# Patient Record
Sex: MALE | Employment: UNEMPLOYED | URBAN - METROPOLITAN AREA
[De-identification: names, ages, dates, MRNs, and addresses within clinical notes are randomized per-mention and may not be internally consistent; named-entity substitution may affect disease eponyms.]

---

## 2021-10-20 ENCOUNTER — LAB ENCOUNTER (OUTPATIENT)
Dept: LAB | Facility: HOSPITAL | Age: 31
End: 2021-10-20
Attending: UROLOGY
Payer: COMMERCIAL

## 2021-10-20 DIAGNOSIS — N46.9 MALE INFERTILITY: Primary | ICD-10-CM

## 2021-10-20 PROCEDURE — 89320 SEMEN ANAL VOL/COUNT/MOT: CPT

## 2021-11-23 ENCOUNTER — LAB ENCOUNTER (OUTPATIENT)
Dept: LAB | Facility: HOSPITAL | Age: 31
End: 2021-11-23
Attending: UROLOGY
Payer: COMMERCIAL

## 2021-11-23 DIAGNOSIS — N46.9 MALE INFERTILITY: Primary | ICD-10-CM

## 2021-11-23 PROCEDURE — 89320 SEMEN ANAL VOL/COUNT/MOT: CPT

## 2022-05-09 ENCOUNTER — OFFICE VISIT (OUTPATIENT)
Dept: FAMILY MEDICINE CLINIC | Facility: CLINIC | Age: 32
End: 2022-05-09
Payer: COMMERCIAL

## 2022-05-09 VITALS
BODY MASS INDEX: 24.23 KG/M2 | OXYGEN SATURATION: 97 % | SYSTOLIC BLOOD PRESSURE: 120 MMHG | TEMPERATURE: 99 F | DIASTOLIC BLOOD PRESSURE: 80 MMHG | WEIGHT: 199 LBS | HEIGHT: 76 IN | HEART RATE: 101 BPM | RESPIRATION RATE: 16 BRPM

## 2022-05-09 DIAGNOSIS — J06.9 UPPER RESPIRATORY INFECTION, ACUTE: Primary | ICD-10-CM

## 2022-05-09 DIAGNOSIS — Z11.52 ENCOUNTER FOR SCREENING FOR COVID-19: ICD-10-CM

## 2022-05-09 DIAGNOSIS — R05.9 COUGH: ICD-10-CM

## 2022-05-09 DIAGNOSIS — R09.81 NASAL CONGESTION: ICD-10-CM

## 2022-05-09 DIAGNOSIS — Z87.898 HISTORY OF DIARRHEA: ICD-10-CM

## 2022-05-09 LAB — SARS-COV-2 RNA RESP QL NAA+PROBE: NOT DETECTED

## 2022-05-09 PROCEDURE — 3079F DIAST BP 80-89 MM HG: CPT | Performed by: PHYSICIAN ASSISTANT

## 2022-05-09 PROCEDURE — 3008F BODY MASS INDEX DOCD: CPT | Performed by: PHYSICIAN ASSISTANT

## 2022-05-09 PROCEDURE — 99202 OFFICE O/P NEW SF 15 MIN: CPT | Performed by: PHYSICIAN ASSISTANT

## 2022-05-09 PROCEDURE — 3074F SYST BP LT 130 MM HG: CPT | Performed by: PHYSICIAN ASSISTANT

## 2022-05-09 RX ORDER — BENZONATATE 200 MG/1
200 CAPSULE ORAL 2 TIMES DAILY PRN
Qty: 10 CAPSULE | Refills: 0 | Status: SHIPPED | OUTPATIENT
Start: 2022-05-09

## 2022-05-17 ENCOUNTER — OFFICE VISIT (OUTPATIENT)
Dept: FAMILY MEDICINE CLINIC | Facility: CLINIC | Age: 32
End: 2022-05-17
Payer: COMMERCIAL

## 2022-05-17 VITALS
WEIGHT: 199 LBS | TEMPERATURE: 99 F | RESPIRATION RATE: 16 BRPM | SYSTOLIC BLOOD PRESSURE: 133 MMHG | HEART RATE: 110 BPM | DIASTOLIC BLOOD PRESSURE: 65 MMHG | OXYGEN SATURATION: 97 % | BODY MASS INDEX: 24.23 KG/M2 | HEIGHT: 76 IN

## 2022-05-17 DIAGNOSIS — J32.9 RHINOSINUSITIS: Primary | ICD-10-CM

## 2022-05-17 DIAGNOSIS — J31.0 RHINOSINUSITIS: Primary | ICD-10-CM

## 2022-05-17 DIAGNOSIS — J20.9 ACUTE BRONCHITIS, UNSPECIFIED ORGANISM: ICD-10-CM

## 2022-05-17 PROCEDURE — 3008F BODY MASS INDEX DOCD: CPT | Performed by: NURSE PRACTITIONER

## 2022-05-17 PROCEDURE — 3075F SYST BP GE 130 - 139MM HG: CPT | Performed by: NURSE PRACTITIONER

## 2022-05-17 PROCEDURE — 99213 OFFICE O/P EST LOW 20 MIN: CPT | Performed by: NURSE PRACTITIONER

## 2022-05-17 PROCEDURE — 3078F DIAST BP <80 MM HG: CPT | Performed by: NURSE PRACTITIONER

## 2022-05-17 RX ORDER — DOXYCYCLINE HYCLATE 100 MG
TABLET ORAL
Qty: 14 TABLET | Refills: 0 | Status: SHIPPED | OUTPATIENT
Start: 2022-05-17

## 2022-06-02 ENCOUNTER — LAB ENCOUNTER (OUTPATIENT)
Dept: LAB | Facility: HOSPITAL | Age: 32
End: 2022-06-02
Attending: INTERNAL MEDICINE
Payer: COMMERCIAL

## 2022-06-02 ENCOUNTER — HOSPITAL ENCOUNTER (OUTPATIENT)
Dept: NUCLEAR MEDICINE | Facility: HOSPITAL | Age: 32
Discharge: HOME | End: 2022-06-02
Attending: INTERNAL MEDICINE
Payer: COMMERCIAL

## 2022-06-02 DIAGNOSIS — E05.00 BASEDOW'S DISEASE: Primary | ICD-10-CM

## 2022-06-02 DIAGNOSIS — E05.00 GRAVES DISEASE: ICD-10-CM

## 2022-06-02 LAB
ALBUMIN SERPL-MCNC: 3.1 G/DL (ref 3.4–5)
ALBUMIN/GLOB SERPL: 0.8 {RATIO} (ref 1–2)
ALP LIVER SERPL-CCNC: 106 U/L
ALT SERPL-CCNC: 211 U/L
ANION GAP SERPL CALC-SCNC: 7 MMOL/L (ref 0–18)
AST SERPL-CCNC: 68 U/L (ref 15–37)
BILIRUB SERPL-MCNC: 0.7 MG/DL (ref 0.1–2)
BUN BLD-MCNC: 20 MG/DL (ref 7–18)
BUN/CREAT SERPL: 25.3 (ref 10–20)
CALCIUM BLD-MCNC: 10.7 MG/DL (ref 8.5–10.1)
CHLORIDE SERPL-SCNC: 106 MMOL/L (ref 98–112)
CO2 SERPL-SCNC: 27 MMOL/L (ref 21–32)
CREAT BLD-MCNC: 0.79 MG/DL
FASTING STATUS PATIENT QL REPORTED: YES
GLOBULIN PLAS-MCNC: 4.1 G/DL (ref 2.8–4.4)
GLUCOSE BLD-MCNC: 97 MG/DL (ref 70–99)
OSMOLALITY SERPL CALC.SUM OF ELEC: 293 MOSM/KG (ref 275–295)
POTASSIUM SERPL-SCNC: 4.2 MMOL/L (ref 3.5–5.1)
PROT SERPL-MCNC: 7.2 G/DL (ref 6.4–8.2)
SODIUM SERPL-SCNC: 140 MMOL/L (ref 136–145)
T3FREE SERPL-MCNC: 20.16 PG/ML (ref 2.4–4.2)
T4 FREE SERPL-MCNC: 5.5 NG/DL (ref 0.8–1.7)
TSI SER-ACNC: <0.005 MIU/ML (ref 0.36–3.74)

## 2022-06-02 PROCEDURE — 84481 FREE ASSAY (FT-3): CPT

## 2022-06-02 PROCEDURE — 84443 ASSAY THYROID STIM HORMONE: CPT

## 2022-06-02 PROCEDURE — 36415 COLL VENOUS BLD VENIPUNCTURE: CPT

## 2022-06-02 PROCEDURE — 78014 THYROID IMAGING W/BLOOD FLOW: CPT | Performed by: INTERNAL MEDICINE

## 2022-06-02 PROCEDURE — 80053 COMPREHEN METABOLIC PANEL: CPT

## 2022-06-02 PROCEDURE — 84439 ASSAY OF FREE THYROXINE: CPT

## 2022-06-02 PROCEDURE — 84445 ASSAY OF TSI GLOBULIN: CPT

## 2022-06-06 LAB — THYROID STIMULATING IMMUNOGLOBULIN: 4.53 IU/L

## 2022-06-14 ENCOUNTER — LAB ENCOUNTER (OUTPATIENT)
Dept: LAB | Facility: HOSPITAL | Age: 32
End: 2022-06-14
Attending: INTERNAL MEDICINE
Payer: COMMERCIAL

## 2022-06-14 DIAGNOSIS — E05.00 GRAVES DISEASE: Primary | ICD-10-CM

## 2022-06-14 LAB
ALBUMIN SERPL-MCNC: 3.4 G/DL (ref 3.4–5)
ALBUMIN/GLOB SERPL: 0.9 {RATIO} (ref 1–2)
ALP LIVER SERPL-CCNC: 108 U/L
ALT SERPL-CCNC: 211 U/L
ANION GAP SERPL CALC-SCNC: 9 MMOL/L (ref 0–18)
AST SERPL-CCNC: 55 U/L (ref 15–37)
BASOPHILS # BLD AUTO: 0.05 X10(3) UL (ref 0–0.2)
BASOPHILS NFR BLD AUTO: 0.9 %
BILIRUB SERPL-MCNC: 0.8 MG/DL (ref 0.1–2)
BUN BLD-MCNC: 18 MG/DL (ref 7–18)
BUN/CREAT SERPL: 21.4 (ref 10–20)
CALCIUM BLD-MCNC: 10.2 MG/DL (ref 8.5–10.1)
CHLORIDE SERPL-SCNC: 108 MMOL/L (ref 98–112)
CO2 SERPL-SCNC: 22 MMOL/L (ref 21–32)
CREAT BLD-MCNC: 0.84 MG/DL
DEPRECATED RDW RBC AUTO: 44.8 FL (ref 35.1–46.3)
EOSINOPHIL # BLD AUTO: 0.98 X10(3) UL (ref 0–0.7)
EOSINOPHIL NFR BLD AUTO: 17.3 %
ERYTHROCYTE [DISTWIDTH] IN BLOOD BY AUTOMATED COUNT: 14 % (ref 11–15)
FASTING STATUS PATIENT QL REPORTED: YES
GLOBULIN PLAS-MCNC: 3.6 G/DL (ref 2.8–4.4)
GLUCOSE BLD-MCNC: 100 MG/DL (ref 70–99)
HCT VFR BLD AUTO: 39.6 %
HGB BLD-MCNC: 12.8 G/DL
IMM GRANULOCYTES # BLD AUTO: 0.01 X10(3) UL (ref 0–1)
IMM GRANULOCYTES NFR BLD: 0.2 %
LYMPHOCYTES # BLD AUTO: 1.92 X10(3) UL (ref 1–4)
LYMPHOCYTES NFR BLD AUTO: 33.9 %
MCH RBC QN AUTO: 28.8 PG (ref 26–34)
MCHC RBC AUTO-ENTMCNC: 32.3 G/DL (ref 31–37)
MCV RBC AUTO: 89.2 FL
MONOCYTES # BLD AUTO: 0.52 X10(3) UL (ref 0.1–1)
MONOCYTES NFR BLD AUTO: 9.2 %
NEUTROPHILS # BLD AUTO: 2.18 X10 (3) UL (ref 1.5–7.7)
NEUTROPHILS # BLD AUTO: 2.18 X10(3) UL (ref 1.5–7.7)
NEUTROPHILS NFR BLD AUTO: 38.5 %
OSMOLALITY SERPL CALC.SUM OF ELEC: 290 MOSM/KG (ref 275–295)
PLATELET # BLD AUTO: 268 10(3)UL (ref 150–450)
POTASSIUM SERPL-SCNC: 4.1 MMOL/L (ref 3.5–5.1)
PROT SERPL-MCNC: 7 G/DL (ref 6.4–8.2)
RBC # BLD AUTO: 4.44 X10(6)UL
SODIUM SERPL-SCNC: 139 MMOL/L (ref 136–145)
T3FREE SERPL-MCNC: 6.75 PG/ML (ref 2.4–4.2)
T4 FREE SERPL-MCNC: 2.5 NG/DL (ref 0.8–1.7)
WBC # BLD AUTO: 5.7 X10(3) UL (ref 4–11)

## 2022-06-14 PROCEDURE — 84481 FREE ASSAY (FT-3): CPT

## 2022-06-14 PROCEDURE — 80053 COMPREHEN METABOLIC PANEL: CPT

## 2022-06-14 PROCEDURE — 84439 ASSAY OF FREE THYROXINE: CPT

## 2022-06-14 PROCEDURE — 36415 COLL VENOUS BLD VENIPUNCTURE: CPT

## 2022-06-14 PROCEDURE — 85025 COMPLETE CBC W/AUTO DIFF WBC: CPT

## 2022-07-01 ENCOUNTER — HOSPITAL ENCOUNTER (OUTPATIENT)
Dept: NUCLEAR MEDICINE | Facility: HOSPITAL | Age: 32
Discharge: HOME | End: 2022-07-01
Attending: INTERNAL MEDICINE
Payer: COMMERCIAL

## 2022-07-01 DIAGNOSIS — E05.00 GRAVES DISEASE: ICD-10-CM

## 2022-07-01 PROCEDURE — 79005 NUCLEAR RX ORAL ADMIN: CPT | Performed by: INTERNAL MEDICINE

## 2022-07-21 ENCOUNTER — LAB ENCOUNTER (OUTPATIENT)
Dept: LAB | Facility: HOSPITAL | Age: 32
End: 2022-07-21
Attending: INTERNAL MEDICINE
Payer: COMMERCIAL

## 2022-07-21 DIAGNOSIS — E05.00 GRAVES DISEASE: ICD-10-CM

## 2022-07-21 LAB
ALBUMIN SERPL-MCNC: 4 G/DL (ref 3.4–5)
ALBUMIN/GLOB SERPL: 1.1 {RATIO} (ref 1–2)
ALP LIVER SERPL-CCNC: 116 U/L
ALT SERPL-CCNC: 55 U/L
ANION GAP SERPL CALC-SCNC: 9 MMOL/L (ref 0–18)
AST SERPL-CCNC: 64 U/L (ref 15–37)
BASOPHILS # BLD AUTO: 0.06 X10(3) UL (ref 0–0.2)
BASOPHILS NFR BLD AUTO: 1.1 %
BILIRUB SERPL-MCNC: 0.7 MG/DL (ref 0.1–2)
BUN BLD-MCNC: 20 MG/DL (ref 7–18)
BUN/CREAT SERPL: 22.5 (ref 10–20)
CALCIUM BLD-MCNC: 10 MG/DL (ref 8.5–10.1)
CHLORIDE SERPL-SCNC: 109 MMOL/L (ref 98–112)
CO2 SERPL-SCNC: 22 MMOL/L (ref 21–32)
CREAT BLD-MCNC: 0.89 MG/DL
DEPRECATED RDW RBC AUTO: 47.2 FL (ref 35.1–46.3)
EOSINOPHIL # BLD AUTO: 0.33 X10(3) UL (ref 0–0.7)
EOSINOPHIL NFR BLD AUTO: 6.2 %
ERYTHROCYTE [DISTWIDTH] IN BLOOD BY AUTOMATED COUNT: 13.9 % (ref 11–15)
FASTING STATUS PATIENT QL REPORTED: YES
GLOBULIN PLAS-MCNC: 3.7 G/DL (ref 2.8–4.4)
GLUCOSE BLD-MCNC: 95 MG/DL (ref 70–99)
HCT VFR BLD AUTO: 41.5 %
HGB BLD-MCNC: 13.5 G/DL
IMM GRANULOCYTES # BLD AUTO: 0.02 X10(3) UL (ref 0–1)
IMM GRANULOCYTES NFR BLD: 0.4 %
LYMPHOCYTES # BLD AUTO: 1.13 X10(3) UL (ref 1–4)
LYMPHOCYTES NFR BLD AUTO: 21.1 %
MCH RBC QN AUTO: 29.9 PG (ref 26–34)
MCHC RBC AUTO-ENTMCNC: 32.5 G/DL (ref 31–37)
MCV RBC AUTO: 91.8 FL
MONOCYTES # BLD AUTO: 0.52 X10(3) UL (ref 0.1–1)
MONOCYTES NFR BLD AUTO: 9.7 %
NEUTROPHILS # BLD AUTO: 3.3 X10 (3) UL (ref 1.5–7.7)
NEUTROPHILS # BLD AUTO: 3.3 X10(3) UL (ref 1.5–7.7)
NEUTROPHILS NFR BLD AUTO: 61.5 %
OSMOLALITY SERPL CALC.SUM OF ELEC: 292 MOSM/KG (ref 275–295)
PLATELET # BLD AUTO: 290 10(3)UL (ref 150–450)
POTASSIUM SERPL-SCNC: 4.1 MMOL/L (ref 3.5–5.1)
PROT SERPL-MCNC: 7.7 G/DL (ref 6.4–8.2)
RBC # BLD AUTO: 4.52 X10(6)UL
SODIUM SERPL-SCNC: 140 MMOL/L (ref 136–145)
T3FREE SERPL-MCNC: 3.27 PG/ML (ref 2.4–4.2)
T4 FREE SERPL-MCNC: 0.9 NG/DL (ref 0.8–1.7)
WBC # BLD AUTO: 5.4 X10(3) UL (ref 4–11)

## 2022-07-21 PROCEDURE — 84439 ASSAY OF FREE THYROXINE: CPT

## 2022-07-21 PROCEDURE — 80053 COMPREHEN METABOLIC PANEL: CPT

## 2022-07-21 PROCEDURE — 36415 COLL VENOUS BLD VENIPUNCTURE: CPT

## 2022-07-21 PROCEDURE — 84481 FREE ASSAY (FT-3): CPT

## 2022-07-21 PROCEDURE — 85025 COMPLETE CBC W/AUTO DIFF WBC: CPT

## 2022-08-03 ENCOUNTER — LAB ENCOUNTER (OUTPATIENT)
Dept: LAB | Facility: HOSPITAL | Age: 32
End: 2022-08-03
Attending: INTERNAL MEDICINE
Payer: COMMERCIAL

## 2022-08-03 DIAGNOSIS — E05.00 GRAVES DISEASE: ICD-10-CM

## 2022-08-03 LAB
ALBUMIN SERPL-MCNC: 3.8 G/DL (ref 3.4–5)
ALBUMIN/GLOB SERPL: 1.1 {RATIO} (ref 1–2)
ALP LIVER SERPL-CCNC: 142 U/L
ALT SERPL-CCNC: 33 U/L
ANION GAP SERPL CALC-SCNC: 6 MMOL/L (ref 0–18)
AST SERPL-CCNC: 16 U/L (ref 15–37)
BASOPHILS # BLD AUTO: 0.05 X10(3) UL (ref 0–0.2)
BASOPHILS NFR BLD AUTO: 0.9 %
BILIRUB SERPL-MCNC: 0.4 MG/DL (ref 0.1–2)
BUN BLD-MCNC: 14 MG/DL (ref 7–18)
BUN/CREAT SERPL: 15.6 (ref 10–20)
CALCIUM BLD-MCNC: 9.7 MG/DL (ref 8.5–10.1)
CHLORIDE SERPL-SCNC: 112 MMOL/L (ref 98–112)
CO2 SERPL-SCNC: 25 MMOL/L (ref 21–32)
CREAT BLD-MCNC: 0.9 MG/DL
DEPRECATED RDW RBC AUTO: 48.3 FL (ref 35.1–46.3)
EOSINOPHIL # BLD AUTO: 0.34 X10(3) UL (ref 0–0.7)
EOSINOPHIL NFR BLD AUTO: 6.4 %
ERYTHROCYTE [DISTWIDTH] IN BLOOD BY AUTOMATED COUNT: 14 % (ref 11–15)
FASTING STATUS PATIENT QL REPORTED: YES
GFR SERPLBLD BASED ON 1.73 SQ M-ARVRAT: 116 ML/MIN/1.73M2 (ref 60–?)
GLOBULIN PLAS-MCNC: 3.4 G/DL (ref 2.8–4.4)
GLUCOSE BLD-MCNC: 95 MG/DL (ref 70–99)
HCT VFR BLD AUTO: 43.6 %
HGB BLD-MCNC: 13.6 G/DL
IMM GRANULOCYTES # BLD AUTO: 0.02 X10(3) UL (ref 0–1)
IMM GRANULOCYTES NFR BLD: 0.4 %
LYMPHOCYTES # BLD AUTO: 1.4 X10(3) UL (ref 1–4)
LYMPHOCYTES NFR BLD AUTO: 26.4 %
MCH RBC QN AUTO: 29.2 PG (ref 26–34)
MCHC RBC AUTO-ENTMCNC: 31.2 G/DL (ref 31–37)
MCV RBC AUTO: 93.8 FL
MONOCYTES # BLD AUTO: 0.55 X10(3) UL (ref 0.1–1)
MONOCYTES NFR BLD AUTO: 10.4 %
NEUTROPHILS # BLD AUTO: 2.94 X10 (3) UL (ref 1.5–7.7)
NEUTROPHILS # BLD AUTO: 2.94 X10(3) UL (ref 1.5–7.7)
NEUTROPHILS NFR BLD AUTO: 55.5 %
OSMOLALITY SERPL CALC.SUM OF ELEC: 296 MOSM/KG (ref 275–295)
PLATELET # BLD AUTO: 260 10(3)UL (ref 150–450)
POTASSIUM SERPL-SCNC: 4 MMOL/L (ref 3.5–5.1)
PROT SERPL-MCNC: 7.2 G/DL (ref 6.4–8.2)
RBC # BLD AUTO: 4.65 X10(6)UL
SODIUM SERPL-SCNC: 143 MMOL/L (ref 136–145)
T3FREE SERPL-MCNC: 3.3 PG/ML (ref 2.4–4.2)
T4 FREE SERPL-MCNC: 1 NG/DL (ref 0.8–1.7)
WBC # BLD AUTO: 5.3 X10(3) UL (ref 4–11)

## 2022-08-03 PROCEDURE — 84439 ASSAY OF FREE THYROXINE: CPT

## 2022-08-03 PROCEDURE — 36415 COLL VENOUS BLD VENIPUNCTURE: CPT

## 2022-08-03 PROCEDURE — 85025 COMPLETE CBC W/AUTO DIFF WBC: CPT

## 2022-08-03 PROCEDURE — 80053 COMPREHEN METABOLIC PANEL: CPT

## 2022-08-03 PROCEDURE — 84481 FREE ASSAY (FT-3): CPT

## 2022-09-19 ENCOUNTER — LAB ENCOUNTER (OUTPATIENT)
Dept: LAB | Facility: HOSPITAL | Age: 32
End: 2022-09-19
Attending: INTERNAL MEDICINE

## 2022-09-19 DIAGNOSIS — E05.00 GRAVES DISEASE: ICD-10-CM

## 2022-09-19 LAB
ALBUMIN SERPL-MCNC: 4 G/DL (ref 3.4–5)
ALBUMIN/GLOB SERPL: 1.3 {RATIO} (ref 1–2)
ALP LIVER SERPL-CCNC: 146 U/L
ALT SERPL-CCNC: 37 U/L
ANION GAP SERPL CALC-SCNC: 5 MMOL/L (ref 0–18)
AST SERPL-CCNC: 53 U/L (ref 15–37)
BASOPHILS # BLD AUTO: 0.08 X10(3) UL (ref 0–0.2)
BASOPHILS NFR BLD AUTO: 1.3 %
BILIRUB SERPL-MCNC: 0.3 MG/DL (ref 0.1–2)
BUN BLD-MCNC: 20 MG/DL (ref 7–18)
BUN/CREAT SERPL: 15.3 (ref 10–20)
CALCIUM BLD-MCNC: 9.1 MG/DL (ref 8.5–10.1)
CHLORIDE SERPL-SCNC: 107 MMOL/L (ref 98–112)
CO2 SERPL-SCNC: 27 MMOL/L (ref 21–32)
CREAT BLD-MCNC: 1.31 MG/DL
DEPRECATED RDW RBC AUTO: 45.9 FL (ref 35.1–46.3)
EOSINOPHIL # BLD AUTO: 0.45 X10(3) UL (ref 0–0.7)
EOSINOPHIL NFR BLD AUTO: 7.3 %
ERYTHROCYTE [DISTWIDTH] IN BLOOD BY AUTOMATED COUNT: 13.4 % (ref 11–15)
FASTING STATUS PATIENT QL REPORTED: YES
GFR SERPLBLD BASED ON 1.73 SQ M-ARVRAT: 74 ML/MIN/1.73M2 (ref 60–?)
GLOBULIN PLAS-MCNC: 3.1 G/DL (ref 2.8–4.4)
GLUCOSE BLD-MCNC: 98 MG/DL (ref 70–99)
HCT VFR BLD AUTO: 43.8 %
HGB BLD-MCNC: 14.5 G/DL
IMM GRANULOCYTES # BLD AUTO: 0.03 X10(3) UL (ref 0–1)
IMM GRANULOCYTES NFR BLD: 0.5 %
LYMPHOCYTES # BLD AUTO: 2.14 X10(3) UL (ref 1–4)
LYMPHOCYTES NFR BLD AUTO: 34.8 %
MCH RBC QN AUTO: 31 PG (ref 26–34)
MCHC RBC AUTO-ENTMCNC: 33.1 G/DL (ref 31–37)
MCV RBC AUTO: 93.6 FL
MONOCYTES # BLD AUTO: 0.36 X10(3) UL (ref 0.1–1)
MONOCYTES NFR BLD AUTO: 5.9 %
NEUTROPHILS # BLD AUTO: 3.09 X10 (3) UL (ref 1.5–7.7)
NEUTROPHILS # BLD AUTO: 3.09 X10(3) UL (ref 1.5–7.7)
NEUTROPHILS NFR BLD AUTO: 50.2 %
OSMOLALITY SERPL CALC.SUM OF ELEC: 291 MOSM/KG (ref 275–295)
PLATELET # BLD AUTO: 209 10(3)UL (ref 150–450)
POTASSIUM SERPL-SCNC: 4.2 MMOL/L (ref 3.5–5.1)
PROT SERPL-MCNC: 7.1 G/DL (ref 6.4–8.2)
RBC # BLD AUTO: 4.68 X10(6)UL
SODIUM SERPL-SCNC: 139 MMOL/L (ref 136–145)
T3FREE SERPL-MCNC: 0.79 PG/ML (ref 2.4–4.2)
T4 FREE SERPL-MCNC: 0.2 NG/DL (ref 0.8–1.7)
WBC # BLD AUTO: 6.2 X10(3) UL (ref 4–11)

## 2022-09-19 PROCEDURE — 80053 COMPREHEN METABOLIC PANEL: CPT

## 2022-09-19 PROCEDURE — 84439 ASSAY OF FREE THYROXINE: CPT

## 2022-09-19 PROCEDURE — 85025 COMPLETE CBC W/AUTO DIFF WBC: CPT

## 2022-09-19 PROCEDURE — 36415 COLL VENOUS BLD VENIPUNCTURE: CPT

## 2022-09-19 PROCEDURE — 84481 FREE ASSAY (FT-3): CPT

## 2022-11-01 ENCOUNTER — OFFICE VISIT (OUTPATIENT)
Dept: OTOLARYNGOLOGY | Facility: CLINIC | Age: 32
End: 2022-11-01
Payer: COMMERCIAL

## 2022-11-01 DIAGNOSIS — J33.9 NASAL POLYPOSIS: Primary | ICD-10-CM

## 2022-11-01 PROCEDURE — 31231 NASAL ENDOSCOPY DX: CPT | Performed by: STUDENT IN AN ORGANIZED HEALTH CARE EDUCATION/TRAINING PROGRAM

## 2022-11-01 PROCEDURE — 99204 OFFICE O/P NEW MOD 45 MIN: CPT | Performed by: STUDENT IN AN ORGANIZED HEALTH CARE EDUCATION/TRAINING PROGRAM

## 2022-11-01 RX ORDER — BUDESONIDE 1 MG/2ML
1 INHALANT ORAL 2 TIMES DAILY
Qty: 60 EACH | Refills: 0 | Status: SHIPPED | OUTPATIENT
Start: 2022-11-01 | End: 2022-12-01

## 2022-11-01 RX ORDER — PREDNISONE 20 MG/1
TABLET ORAL
Qty: 23 TABLET | Refills: 0 | Status: SHIPPED | OUTPATIENT
Start: 2022-11-01 | End: 2022-11-14

## 2022-11-11 ENCOUNTER — HOSPITAL ENCOUNTER (OUTPATIENT)
Dept: CT IMAGING | Facility: HOSPITAL | Age: 32
Discharge: HOME | End: 2022-11-11
Attending: STUDENT IN AN ORGANIZED HEALTH CARE EDUCATION/TRAINING PROGRAM
Payer: COMMERCIAL

## 2022-11-11 DIAGNOSIS — J33.9 NASAL POLYPOSIS: ICD-10-CM

## 2022-11-11 PROCEDURE — 70486 CT MAXILLOFACIAL W/O DYE: CPT | Performed by: STUDENT IN AN ORGANIZED HEALTH CARE EDUCATION/TRAINING PROGRAM

## 2022-11-15 ENCOUNTER — LAB ENCOUNTER (OUTPATIENT)
Dept: LAB | Facility: HOSPITAL | Age: 32
End: 2022-11-15
Attending: INTERNAL MEDICINE
Payer: COMMERCIAL

## 2022-11-15 DIAGNOSIS — E05.00 GRAVES' DISEASE: Primary | ICD-10-CM

## 2022-11-15 LAB
T4 FREE SERPL-MCNC: 1.2 NG/DL (ref 0.8–1.7)
TSI SER-ACNC: 3.06 MIU/ML (ref 0.36–3.74)

## 2022-11-15 PROCEDURE — 36415 COLL VENOUS BLD VENIPUNCTURE: CPT

## 2022-11-15 PROCEDURE — 84443 ASSAY THYROID STIM HORMONE: CPT

## 2022-11-15 PROCEDURE — 84439 ASSAY OF FREE THYROXINE: CPT

## 2022-11-28 RX ORDER — BUDESONIDE 1 MG/2ML
1 INHALANT ORAL 2 TIMES DAILY
Qty: 60 ML | Refills: 0 | Status: SHIPPED | OUTPATIENT
Start: 2022-11-28 | End: 2022-12-28

## 2022-12-05 ENCOUNTER — OFFICE VISIT (OUTPATIENT)
Dept: OTOLARYNGOLOGY | Facility: CLINIC | Age: 32
End: 2022-12-05
Payer: COMMERCIAL

## 2022-12-05 VITALS — WEIGHT: 199 LBS | TEMPERATURE: 98 F | HEIGHT: 76 IN | BODY MASS INDEX: 24.23 KG/M2

## 2022-12-05 DIAGNOSIS — J33.9 NASAL POLYPOSIS: Primary | ICD-10-CM

## 2022-12-05 PROCEDURE — 3008F BODY MASS INDEX DOCD: CPT | Performed by: STUDENT IN AN ORGANIZED HEALTH CARE EDUCATION/TRAINING PROGRAM

## 2022-12-05 PROCEDURE — 99214 OFFICE O/P EST MOD 30 MIN: CPT | Performed by: STUDENT IN AN ORGANIZED HEALTH CARE EDUCATION/TRAINING PROGRAM

## 2022-12-05 RX ORDER — BUDESONIDE 1 MG/2ML
1 INHALANT ORAL 2 TIMES DAILY
Qty: 60 ML | Refills: 3 | Status: SHIPPED | OUTPATIENT
Start: 2022-12-05 | End: 2023-01-04

## 2022-12-14 ENCOUNTER — OFFICE VISIT (OUTPATIENT)
Dept: ALLERGY | Facility: CLINIC | Age: 32
End: 2022-12-14
Payer: COMMERCIAL

## 2022-12-14 ENCOUNTER — NURSE ONLY (OUTPATIENT)
Dept: ALLERGY | Facility: CLINIC | Age: 32
End: 2022-12-14
Payer: COMMERCIAL

## 2022-12-14 VITALS
HEIGHT: 76 IN | BODY MASS INDEX: 25.88 KG/M2 | HEART RATE: 87 BPM | DIASTOLIC BLOOD PRESSURE: 91 MMHG | SYSTOLIC BLOOD PRESSURE: 128 MMHG | WEIGHT: 212.5 LBS

## 2022-12-14 DIAGNOSIS — Z92.29 COVID-19 VACCINE SERIES COMPLETED: ICD-10-CM

## 2022-12-14 DIAGNOSIS — J30.89 SEASONAL AND PERENNIAL ALLERGIC RHINOCONJUNCTIVITIS: Primary | ICD-10-CM

## 2022-12-14 DIAGNOSIS — J30.89 SEASONAL AND PERENNIAL ALLERGIC RHINOCONJUNCTIVITIS: ICD-10-CM

## 2022-12-14 DIAGNOSIS — H10.10 SEASONAL AND PERENNIAL ALLERGIC RHINOCONJUNCTIVITIS: Primary | ICD-10-CM

## 2022-12-14 DIAGNOSIS — J30.2 SEASONAL AND PERENNIAL ALLERGIC RHINOCONJUNCTIVITIS: Primary | ICD-10-CM

## 2022-12-14 DIAGNOSIS — Z23 FLU VACCINE NEED: ICD-10-CM

## 2022-12-14 DIAGNOSIS — J30.2 SEASONAL AND PERENNIAL ALLERGIC RHINOCONJUNCTIVITIS: ICD-10-CM

## 2022-12-14 DIAGNOSIS — H10.10 SEASONAL AND PERENNIAL ALLERGIC RHINOCONJUNCTIVITIS: ICD-10-CM

## 2022-12-14 DIAGNOSIS — J33.9 NASAL POLYP: ICD-10-CM

## 2022-12-14 DIAGNOSIS — J32.2 CHRONIC ETHMOIDAL SINUSITIS: ICD-10-CM

## 2022-12-14 PROCEDURE — 3008F BODY MASS INDEX DOCD: CPT | Performed by: ALLERGY & IMMUNOLOGY

## 2022-12-14 PROCEDURE — 99244 OFF/OP CNSLTJ NEW/EST MOD 40: CPT | Performed by: ALLERGY & IMMUNOLOGY

## 2022-12-14 PROCEDURE — 95004 PERQ TESTS W/ALRGNC XTRCS: CPT | Performed by: ALLERGY & IMMUNOLOGY

## 2022-12-14 PROCEDURE — 3074F SYST BP LT 130 MM HG: CPT | Performed by: ALLERGY & IMMUNOLOGY

## 2022-12-14 PROCEDURE — 3080F DIAST BP >= 90 MM HG: CPT | Performed by: ALLERGY & IMMUNOLOGY

## 2022-12-14 NOTE — PATIENT INSTRUCTIONS
#1 chronic sinusitis with nasal polyposis  Followed by ENT with Dr. Yoni Sepulveda. Prior course of prednisone with improvement in symptoms. Patient has tried Flonase in the past without significant improvement. Patient is also been on budesonide irrigation of his sinuses. No prior Singulair or Xyzal.  See above skin testing to screen for environmental triggers    Reviewed treatment options for nasal polyps including prednisone which provides intermittent relief but not a cure. Would recommend prednisone no more than once or twice a year in my opinion  Sinus surgery has been discussed by Dr. Yoni Sepulveda  Reviewed treatment of underlying environmental allergens if they are detected with avoidance measures medications immunotherapy  Reviewed Dupixent is a potential biologic medication for treatment of underlying nasal polyposis. Patient wished to observe clinically at this time. Should symptoms worsen then may consider implementation of the above measures. Including Dupixent  Reviewed depictions trial would initially be for 3 to 6 months. Potential indefinite treatment    #2 allergic rhinitis  See above skin testing to screen for environmental allergens  Reviewed avoidance measures and potential treatment option immunotherapy  Reviewed potential medication options including Singulair Xyzal and intranasal steroid sprays.   Ranjit [place] has been indicated as a nasal steroid spray for nasal polyposis      #3 flu vaccine up-to-date    #4 COVID vaccines up-to-date

## 2023-01-10 ENCOUNTER — LAB ENCOUNTER (OUTPATIENT)
Dept: LAB | Facility: HOSPITAL | Age: 33
End: 2023-01-10
Attending: INTERNAL MEDICINE
Payer: COMMERCIAL

## 2023-01-10 DIAGNOSIS — E05.00 GRAVES DISEASE: ICD-10-CM

## 2023-01-10 LAB
ALBUMIN SERPL-MCNC: 4.3 G/DL (ref 3.4–5)
ALBUMIN/GLOB SERPL: 1.2 {RATIO} (ref 1–2)
ALP LIVER SERPL-CCNC: 110 U/L
ALT SERPL-CCNC: 28 U/L
ANION GAP SERPL CALC-SCNC: 5 MMOL/L (ref 0–18)
AST SERPL-CCNC: 13 U/L (ref 15–37)
BASOPHILS # BLD AUTO: 0.08 X10(3) UL (ref 0–0.2)
BASOPHILS NFR BLD AUTO: 1.4 %
BILIRUB SERPL-MCNC: 0.7 MG/DL (ref 0.1–2)
BUN BLD-MCNC: 19 MG/DL (ref 7–18)
BUN/CREAT SERPL: 16.8 (ref 10–20)
CALCIUM BLD-MCNC: 9.5 MG/DL (ref 8.5–10.1)
CHLORIDE SERPL-SCNC: 105 MMOL/L (ref 98–112)
CO2 SERPL-SCNC: 30 MMOL/L (ref 21–32)
CREAT BLD-MCNC: 1.13 MG/DL
DEPRECATED RDW RBC AUTO: 40.6 FL (ref 35.1–46.3)
EOSINOPHIL # BLD AUTO: 0.32 X10(3) UL (ref 0–0.7)
EOSINOPHIL NFR BLD AUTO: 5.8 %
ERYTHROCYTE [DISTWIDTH] IN BLOOD BY AUTOMATED COUNT: 11.7 % (ref 11–15)
FASTING STATUS PATIENT QL REPORTED: YES
GFR SERPLBLD BASED ON 1.73 SQ M-ARVRAT: 87 ML/MIN/1.73M2 (ref 60–?)
GLOBULIN PLAS-MCNC: 3.6 G/DL (ref 2.8–4.4)
GLUCOSE BLD-MCNC: 96 MG/DL (ref 70–99)
HCT VFR BLD AUTO: 44.5 %
HGB BLD-MCNC: 14.7 G/DL
IMM GRANULOCYTES # BLD AUTO: 0.02 X10(3) UL (ref 0–1)
IMM GRANULOCYTES NFR BLD: 0.4 %
LYMPHOCYTES # BLD AUTO: 1.82 X10(3) UL (ref 1–4)
LYMPHOCYTES NFR BLD AUTO: 32.9 %
MCH RBC QN AUTO: 31.5 PG (ref 26–34)
MCHC RBC AUTO-ENTMCNC: 33 G/DL (ref 31–37)
MCV RBC AUTO: 95.5 FL
MONOCYTES # BLD AUTO: 0.49 X10(3) UL (ref 0.1–1)
MONOCYTES NFR BLD AUTO: 8.9 %
NEUTROPHILS # BLD AUTO: 2.8 X10 (3) UL (ref 1.5–7.7)
NEUTROPHILS # BLD AUTO: 2.8 X10(3) UL (ref 1.5–7.7)
NEUTROPHILS NFR BLD AUTO: 50.6 %
OSMOLALITY SERPL CALC.SUM OF ELEC: 292 MOSM/KG (ref 275–295)
PLATELET # BLD AUTO: 268 10(3)UL (ref 150–450)
POTASSIUM SERPL-SCNC: 4.1 MMOL/L (ref 3.5–5.1)
PROT SERPL-MCNC: 7.9 G/DL (ref 6.4–8.2)
RBC # BLD AUTO: 4.66 X10(6)UL
SODIUM SERPL-SCNC: 140 MMOL/L (ref 136–145)
T3FREE SERPL-MCNC: 2.59 PG/ML (ref 2.4–4.2)
T4 FREE SERPL-MCNC: 1.2 NG/DL (ref 0.8–1.7)
WBC # BLD AUTO: 5.5 X10(3) UL (ref 4–11)

## 2023-01-10 PROCEDURE — 84439 ASSAY OF FREE THYROXINE: CPT

## 2023-01-10 PROCEDURE — 36415 COLL VENOUS BLD VENIPUNCTURE: CPT

## 2023-01-10 PROCEDURE — 80053 COMPREHEN METABOLIC PANEL: CPT

## 2023-01-10 PROCEDURE — 84481 FREE ASSAY (FT-3): CPT

## 2023-01-10 PROCEDURE — 85025 COMPLETE CBC W/AUTO DIFF WBC: CPT

## 2023-01-27 ENCOUNTER — PATIENT MESSAGE (OUTPATIENT)
Dept: OTOLARYNGOLOGY | Facility: CLINIC | Age: 33
End: 2023-01-27

## 2023-02-01 ENCOUNTER — RX ONLY (RX ONLY)
Age: 33
End: 2023-02-01

## 2023-02-03 ENCOUNTER — PATIENT MESSAGE (OUTPATIENT)
Dept: OTOLARYNGOLOGY | Facility: CLINIC | Age: 33
End: 2023-02-03

## 2023-02-07 RX ORDER — METHYLPREDNISOLONE 4 MG/1
TABLET ORAL
Qty: 21 TABLET | Refills: 0 | Status: SHIPPED | OUTPATIENT
Start: 2023-02-07

## 2023-03-21 ENCOUNTER — LAB ENCOUNTER (OUTPATIENT)
Dept: LAB | Facility: HOSPITAL | Age: 33
End: 2023-03-21
Attending: INTERNAL MEDICINE
Payer: COMMERCIAL

## 2023-03-21 DIAGNOSIS — E03.9 HYPOTHYROIDISM: Primary | ICD-10-CM

## 2023-03-21 LAB
T4 FREE SERPL-MCNC: 1.4 NG/DL (ref 0.8–1.7)
TSI SER-ACNC: 1.24 MIU/ML (ref 0.36–3.74)

## 2023-03-21 PROCEDURE — 84439 ASSAY OF FREE THYROXINE: CPT

## 2023-03-21 PROCEDURE — 36415 COLL VENOUS BLD VENIPUNCTURE: CPT

## 2023-03-21 PROCEDURE — 84443 ASSAY THYROID STIM HORMONE: CPT

## 2023-08-28 ENCOUNTER — LAB ENCOUNTER (OUTPATIENT)
Dept: LAB | Facility: HOSPITAL | Age: 33
End: 2023-08-28
Attending: INTERNAL MEDICINE
Payer: COMMERCIAL

## 2023-08-28 DIAGNOSIS — E03.9 HYPOTHYROIDISM: ICD-10-CM

## 2023-08-28 LAB
T4 FREE SERPL-MCNC: 1.2 NG/DL (ref 0.8–1.7)
TSI SER-ACNC: 4.73 MIU/ML (ref 0.36–3.74)

## 2023-08-28 PROCEDURE — 36415 COLL VENOUS BLD VENIPUNCTURE: CPT

## 2023-08-28 PROCEDURE — 84443 ASSAY THYROID STIM HORMONE: CPT

## 2023-08-28 PROCEDURE — 84439 ASSAY OF FREE THYROXINE: CPT

## 2024-02-20 ENCOUNTER — PATIENT MESSAGE (OUTPATIENT)
Dept: OTOLARYNGOLOGY | Facility: CLINIC | Age: 34
End: 2024-02-20

## 2024-02-23 NOTE — TELEPHONE ENCOUNTER
From: Luis Casper  To: Joon Rosenberg  Sent: 2/20/2024 9:18 PM CST  Subject: Polyp Removal Surgery    Dr. Rosenberg - I saw you about 14 months ago for polyps and various treatment options, the last option was surgery.     After various health events, I’d like to further examine the surgical option to remove my polyps. I was curious if someone from your office:    - could let me know the financial aspect of the surgery with my existing insurance (same as my visit)  -what would need to be done before surgery (I had a CAT scan but that was a bit ago)  - how quickly we can get the surgery on the books (we have a family addition at the end of April that I’d like to work around one way or another)    I look forward to discussing more.     Luis Casper  [phone]

## 2024-02-27 ENCOUNTER — OFFICE VISIT (OUTPATIENT)
Dept: OTOLARYNGOLOGY | Facility: CLINIC | Age: 34
End: 2024-02-27

## 2024-02-27 DIAGNOSIS — J34.89 NASAL OBSTRUCTION: ICD-10-CM

## 2024-02-27 DIAGNOSIS — J34.3 HYPERTROPHY OF BOTH INFERIOR NASAL TURBINATES: ICD-10-CM

## 2024-02-27 DIAGNOSIS — J32.9 CHRONIC SINUSITIS, UNSPECIFIED LOCATION: ICD-10-CM

## 2024-02-27 DIAGNOSIS — J34.2 NASAL SEPTAL DEVIATION: ICD-10-CM

## 2024-02-27 DIAGNOSIS — J33.9 NASAL POLYPOSIS: Primary | ICD-10-CM

## 2024-02-27 PROCEDURE — 99215 OFFICE O/P EST HI 40 MIN: CPT | Performed by: STUDENT IN AN ORGANIZED HEALTH CARE EDUCATION/TRAINING PROGRAM

## 2024-02-27 RX ORDER — LEVOTHYROXINE SODIUM 0.15 MG/1
150 TABLET ORAL DAILY
COMMUNITY

## 2024-02-27 NOTE — PROGRESS NOTES
Luis Casper is a 33 year old male.   Chief Complaint   Patient presents with    Nose Problem     Discuss nasal polyp surgery        ASSESSMENT AND PLAN:   1. Nasal polyposis  33-year-old with a history of chronic sinonasal polyps causing nasal obstruction worse on the right side.  He saw the allergist in 2022 and they went over options including Dupixent and surgery.  The patient is now leaning more towards surgery.  He states that whenever he gets a cold such as from his child he suffers a lot with his sinuses for a number of weeks before he recovers.  A previous CT scan in 2022 demonstrated bilateral chronic sinusitis and nasal polyposis.  He has been using the budesonide sinus irrigations as well as Flonase for greater than 3 months without relief of these issues.  He was previously tried on a steroid burst and taper although symptoms returned after this has been stopped.    Anterior rhinoscopy with large polyp in the posterior right nasal cavity and septal deviation to the left    Patient has been suffering with chronic sinonasal polyposis and recurrent sinusitis.  Previous CT scan in 2022 with evidence of bilateral nasal polyps and pansinusitis.  Has not responded to budesonide irrigations and Flonase for greater than 3 months.  He has been tried on oral steroids in the past but his symptoms returned after they were stopped.  He had met with the allergist discussed their options for the polyps and he would like to proceed with surgery at this point.  Discussed the risk of surgery including recurrence of the polyps.  Will obtain an updated CT scan prior to the surgery.  May place steroid eluding stents during the surgery to maximize the polyp shrinkage.  Discussed the need for budesonide irrigations after the surgery.  He was agreeable with the plan  The patient meets indications for endoscopic sinus surgery. This procedure was discussed including risks, benefits and alternatives. Risks include  bleeding, infection, orbital injury including blindness, CSF leak including meningitis, scarring, need for post operative debridement and irrigation, risks of general anesthesia, need for additional procedures and persistent symptoms. They understand, all questions were answered and would like to proceed.   - CT SINUS [facility](CPT=70486); Future    2. Nasal septal deviation    The patient meets indications for septoplasty and inferior turbinate reduction. These procedures were discussed including risks, benefits and alternatives. Risks include bleeding, infection, septal perforation, CSF leak, risks of general anesthesia, need for additional procedures and persistent symptoms. They understand, all questions were answered and would like to proceed.     3. Nasal obstruction    4. Hypertrophy of both inferior nasal turbinates      5. Chronic sinusitis, unspecified location        The patient indicates understanding of these issues and agrees to the plan.      EXAM:   There were no vitals taken for this visit.    Pertinent exam findings may also be noted above in assessment and plan     System Details   Skin Inspection - Normal.   Constitutional Overall appearance - Normal.   Head/Face Symmetric, TMJ tenderness not present    Eyes EOMI, PERRL   Right ear:  Canal clear, TM intact, no JESUS   Left ear:  Canal clear, TM intact, no JESUS   Nose:  inferior turbinates   enlarged,    Anterior rhinoscopy with large polyp in the posterior right nasal cavity and septal deviation to the left   Oral cavity/Oropharynx: No lesions or masses on inspection or palpation, tonsils symmetric    Neck: Soft without LAD, thyroid not enlarged  Voice clear/ no stridor   Other:      Scopes and Procedures:             Current Outpatient Medications   Medication Sig Dispense Refill    levothyroxine 150 MCG Oral Tab Take 1 tablet (150 mcg total) by mouth daily.      methylPREDNISolone 4 MG Oral Tablet Therapy Pack Take by oral route. 21 tablet 0       Past Medical History:   Diagnosis Date    Allergic rhinitis     Lumbar stress fracture     Quadriceps muscle rupture, left, sequela     non surgical      Social History:  Social History     Socioeconomic History    Marital status:    Tobacco Use    Smoking status: Never    Smokeless tobacco: Never   Vaping Use    Vaping Use: Never used   Substance and Sexual Activity    Alcohol use: Yes     Alcohol/week: 2.0 - 4.0 standard drinks of alcohol     Types: 2 - 4 Glasses of wine per week    Drug use: Never    Sexual activity: Yes   Social History Narrative    Works in Finance.    , no children.  Wife in marketing.    Bikes for exercise.    Golfs.    Former college football kicker          Joon Rosenberg MD  2/27/2024  2:59 PM

## 2024-02-29 ENCOUNTER — TELEPHONE (OUTPATIENT)
Dept: OTOLARYNGOLOGY | Facility: CLINIC | Age: 34
End: 2024-02-29

## 2024-02-29 DIAGNOSIS — J33.9 NASAL POLYP: ICD-10-CM

## 2024-02-29 DIAGNOSIS — J32.9 CHRONIC SINUSITIS, UNSPECIFIED LOCATION: Primary | ICD-10-CM

## 2024-02-29 DIAGNOSIS — J34.2 DEVIATED NASAL SEPTUM: ICD-10-CM

## 2024-02-29 DIAGNOSIS — J34.3 HYPERTROPHY OF NASAL TURBINATES: ICD-10-CM

## 2024-02-29 NOTE — PROGRESS NOTES
Patient scheduled for [facility] ENDOSCOPIC BILATERAL MAXILLARY ANTROSTOMY W/ TISSUE REMOVAL   ENDOSCPIC BILATERAL TOTAL ETHMOIDECTOMY   ENDOSCOPIC BILATERAL SPHENOID   ENDOSCOPIC BILATERAL FRONTAL   ENDOSCOPIC NASAL POLYPECTOMY   BILATERAL SEPTOPLASTY SUBMUCOSAL TURBINATE REDUCTION  BILATERAL FRONTAL SPHENOID BALLOON   BILATERAL SINUVA STENTS  on 3/27/24 at [facility].

## 2024-02-29 NOTE — TELEPHONE ENCOUNTER
Patient scheduled for [employer] ENDOSCOPIC BILATERAL MAXILLARY ANTROSTOMY W/ TISSUE REMOVAL   ENDOSCPIC BILATERAL TOTAL ETHMOIDECTOMY   ENDOSCOPIC BILATERAL SPHENOID   ENDOSCOPIC BILATERAL FRONTAL   ENDOSCOPIC NASAL POLYPECTOMY   BILATERAL SEPTOPLASTY SUBMUCOSAL TURBINATE REDUCTION  BILATERAL FRONTAL SPHENOID BALLOON   BILATERAL SINUVA STENTS  on 3/27/24 at [facility].

## 2024-03-08 ENCOUNTER — HOSPITAL ENCOUNTER (OUTPATIENT)
Dept: CT IMAGING | Facility: HOSPITAL | Age: 34
Discharge: HOME | End: 2024-03-08
Attending: STUDENT IN AN ORGANIZED HEALTH CARE EDUCATION/TRAINING PROGRAM
Payer: COMMERCIAL

## 2024-03-08 DIAGNOSIS — J33.9 NASAL POLYPOSIS: ICD-10-CM

## 2024-03-08 PROCEDURE — 70486 CT MAXILLOFACIAL W/O DYE: CPT | Performed by: STUDENT IN AN ORGANIZED HEALTH CARE EDUCATION/TRAINING PROGRAM

## 2024-03-13 ENCOUNTER — LAB ENCOUNTER (OUTPATIENT)
Dept: LAB | Facility: HOSPITAL | Age: 34
End: 2024-03-13
Attending: INTERNAL MEDICINE
Payer: COMMERCIAL

## 2024-03-13 DIAGNOSIS — J32.9 CHRONIC SINUSITIS, UNSPECIFIED LOCATION: ICD-10-CM

## 2024-03-13 DIAGNOSIS — J34.2 NASAL SEPTAL DEVIATION: ICD-10-CM

## 2024-03-13 DIAGNOSIS — J34.3 HYPERTROPHY OF BOTH INFERIOR NASAL TURBINATES: ICD-10-CM

## 2024-03-13 DIAGNOSIS — J34.89 NASAL OBSTRUCTION: ICD-10-CM

## 2024-03-13 DIAGNOSIS — J33.9 NASAL POLYPOSIS: Primary | ICD-10-CM

## 2024-03-13 DIAGNOSIS — E03.9 HYPOTHYROIDISM: Primary | ICD-10-CM

## 2024-03-13 LAB
T4 FREE SERPL-MCNC: 1.3 NG/DL (ref 0.8–1.7)
TSI SER-ACNC: 1.79 MIU/ML (ref 0.55–4.78)

## 2024-03-13 PROCEDURE — 84443 ASSAY THYROID STIM HORMONE: CPT

## 2024-03-13 PROCEDURE — 36415 COLL VENOUS BLD VENIPUNCTURE: CPT

## 2024-03-13 PROCEDURE — 84439 ASSAY OF FREE THYROXINE: CPT

## 2024-03-13 NOTE — PROGRESS NOTES
Patient scheduled for  [facility] ENDOSCOPIC BILATERAL MAXILLARY ANTROSTOMY W/ TISSUE REMOVAL  ENDOSCPIC BILATERAL TOTAL ETHMOIDECTOMY  ENDOSCOPIC BILATERAL SPHENOID  ENDOSCOPIC BILATERAL FRONTAL  ENDOSCOPIC NASAL POLYPECTOMY  BILATERAL SEPTOPLASTY SUBMUCOSAL TURBINATE REDUCTION BILATERAL FRONTAL SPHENOID BALLOON  BILATERAL SINUVA STENTS on 3/27/24 at [facility] with CPT Codes - 39878, 88239, 10542, [id number], 55693, [id number], [id number], [id number].

## 2024-03-22 NOTE — DISCHARGE INSTRUCTIONS
CELIA SANCHEZ MD  POST-OPERATIVE ENDOSCOPIC SINUS SURGERY INSTRUCTIONS        POST PROCEDURE CARE AND INFECTION PREVENTION:  Expect some bloody drainage from your nose.  The dressing under your nose is called a moustache dressing and is in place to absorb the bloody drainage.  If the dressing becomes saturated more than 4 times in one hour, please contact your doctor. You may use over the counter Afrin Nasal Spray inside the nose to decrease bleeding.  Follow the packaging instructions.  Nasal congestion is not unusual.  Afrin Nasal Spray can also be used to help with congestion.  Do not use for longer than the first five days post-operatively.    DO NOT blow your nose until the doctor tells you it is okay.  In order to clear your nose, begin using an over the counter Saline Nasal Spray the day after surgery.  Use 2 sprays in each nostril at least 2-3 times daily.  Occasionally a plastic splint is placed inside your nose during this surgery.  If you are discharged with a splint in place, it will be removed at your post-op follow up appointment.  NO bending or heavy lifting for 2 weeks after surgery.  Call your doctor for UNUSUAL conditions such as: excessive bleeding, severe pain, temperature greater than 102°, or other unusual complications.  If you have any questions, please call your doctor’s office.  The office phone # is   [phone] (choose option #1).  Please call to make an appointment for your follow-up visit as instructed by your doctor.  You may return to school or work per your doctor’s instructions.  Wash your hands before and after touching your dressing.  Be sure to rub your hands together with warm water and soap for 20 seconds or longer when washing.        MEDICATION:  See Medication Reconciliation Form for any new prescriptions.   DO NOT use Aspirin, Ibuprofen, Motrin, Aleve, or Ecotrin for two weeks following this surgery.  For mild pain use only Acetaminophen (Tylenol).  You may resume your  other usual medications unless instructed otherwise by your doctor.   Avoid alcohol today.  Do not drive, operate machinery or drink alcoholic beverages while taking narcotic pain medication.  Narcotic pain medication may cause constipation.  If no bowel movement in 48 hours, please contact your physician.      IN CASE OF EMERGENCY:  If you are unable to reach your doctor, call or go to the nearest emergency room.  The [facility] Emergency Department’s phone number is [phone].         INSTRUCTIONS FOR PATIENTS WITH GENERAL / IV SEDATION ONLY:  Begin with clear liquids and bland foods then progress to your normal diet if you are not nauseated.  Nausea and vomiting occasionally occur after surgery.  If you are nauseated, remain on clear liquids until it passes.  If nausea persists longer than 24 hours, please notify your doctor.  Rest on the day of surgery.  You may increase activity as tolerated starting tomorrow.  Do not drive, operate hazardous machinery, or make important personal or legal decisions for 24 hours.  You may feel dizzy or lightheaded from anesthesia.  Move cautiously for 24 hours.         HOME INSTRUCTIONS  AMBSURG HOME CARE INSTRUCTIONS: POST-OP ANESTHESIA  The medication that you received for sedation or general anesthesia can last up to 24 hours. Your judgment and reflexes may be altered, even if you feel like your normal self.      We Recommend:   Do not drive any motor vehicle or bicycle   Avoid mowing the lawn, playing sports, or working with power tools/applicances (power saws, electric knives or mixers)   That you have someone stay with you on your first night home   Do not drink alcohol or take sleeping pills or tranquilizers   Do not sign legal documents within 24 hours of your procedure   If you had a nerve block for your surgery, take extra care not to put any pressure on your arm or hand for 24 hours    It is normal:  For you to have a sore throat if you had a  breathing tube during surgery (while you were asleep!). The sore throat should get better within 48 hours. You can gargle with warm salt water (1/2 tsp in 4 oz warm water) or use a throat lozenge for comfort  To feel muscle aches or soreness especially in the abdomen, chest or neck. The achy feeling should go away in the next 24 hours  To feel weak, sleepy or \"wiped out\". Your should start feeling better in the next 24 hours.   To experience mild discomforts such as sore lip or tongue, headache, cramps, gas pains or a bloated feeling in your abdomen.   To experience mild back pain or soreness for a day or two if you had spinal or epidural anesthesia.   If you had laparoscopic surgery, to feel shoulder pain or discomfort on the day of surgery.   For some patients to have nausea after surgery/anesthesia    If you feel nausea or experience vomiting:   Try to move around less.   Eat less than usual or drink only liquids until the next morning   Nausea should resolve in about 24 hours    If you have a problem when you are at home:    Call your surgeons office     Discharge Instructions: After Your Surgery  You’ve just had surgery. During surgery, you were given medicine called anesthesia to keep you relaxed and free of pain. After surgery, you may have some pain or nausea. This is common. Here are some tips for feeling better and getting well after surgery.   Going home  Your healthcare provider will show you how to take care of yourself when you go home. They'll also answer your questions. Have an adult family member or friend drive you home. For the first 24 hours after your surgery:   Don't drive or use heavy equipment.  Don't make important decisions or sign legal papers.  Take medicines as directed.  Don't drink alcohol.  Have someone stay with you, if needed. They can watch for problems and help keep you safe.  Be sure to go to all follow-up visits with your healthcare provider. And rest after your surgery for as  long as your provider tells you to.   Coping with pain  If you have pain after surgery, pain medicine will help you feel better. Take it as directed, before pain becomes severe. Also, ask your healthcare provider or pharmacist about other ways to control pain. This might be with heat, ice, or relaxation. And follow any other instructions your surgeon or nurse gives you.      Stay on schedule with your medicine.     Tips for taking pain medicine  To get the best relief possible, remember these points:   Pain medicines can upset your stomach. Taking them with a little food may help.  Most pain relievers taken by mouth need at least 20 to 30 minutes to start to work.  Don't wait till your pain becomes severe before you take your medicine. Try to time your medicine so that you can take it before starting an activity. This might be before you get dressed, go for a walk, or sit down for dinner.  Constipation is a common side effect of some pain medicines. Call your healthcare provider before taking any medicines such as laxatives or stool softeners to help ease constipation. Also ask if you should skip any foods. Drinking lots of fluids and eating foods such as fruits and vegetables that are high in fiber can also help. Remember, don't take laxatives unless your surgeon has prescribed them.  Drinking alcohol and taking pain medicine can cause dizziness and slow your breathing. It can even be deadly. Don't drink alcohol while taking pain medicine.  Pain medicine can make you react more slowly to things. Don't drive or run machinery while taking pain medicine.  Your healthcare provider may tell you to take acetaminophen to help ease your pain. Ask them how much you're supposed to take each day. Acetaminophen or other pain relievers may interact with your prescription medicines or other over-the-counter (OTC) medicines. Some prescription medicines have acetaminophen and other ingredients in them. Using both prescription and  OTC acetaminophen for pain can cause you to accidentally overdose. Read the labels on your OTC medicines with care. This will help you to clearly know the list of ingredients, how much to take, and any warnings. It may also help you not take too much acetaminophen. If you have questions or don't understand the information, ask your pharmacist or healthcare provider to explain it to you before you take the OTC medicine.   Managing nausea  Some people have an upset stomach (nausea) after surgery. This is often because of anesthesia, pain, or pain medicine, less movement of food in the stomach, or the stress of surgery. These tips will help you handle nausea and eat healthy foods as you get better. If you were on a special food plan before surgery, ask your healthcare provider if you should follow it while you get better. Check with your provider on how your eating should progress. It may depend on the surgery you had. These general tips may help:   Don't push yourself to eat. Your body will tell you when to eat and how much.  Start off with clear liquids and soup. They're easier to digest.  Next try semi-solid foods as you feel ready. These include mashed potatoes, applesauce, and gelatin.  Slowly move to solid foods. Don’t eat fatty, rich, or spicy foods at first.  Don't force yourself to have 3 large meals a day. Instead eat smaller amounts more often.  Take pain medicines with a small amount of solid food, such as crackers or toast. This helps prevent nausea.  When to call your healthcare provider  Call your healthcare provider right away if you have any of these:   You still have too much pain, or the pain gets worse, after taking the medicine. The medicine may not be strong enough. Or there may be a complication from the surgery.  You feel too sleepy, dizzy, or groggy. The medicine may be too strong.  Side effects such as nausea or vomiting. Your healthcare provider may advise taking other medicines to .  Skin  changes such as rash, itching, or hives. This may mean you have an allergic reaction. Your provider may advise taking other medicines.  The incision looks different (for instance, part of it opens up).  Bleeding or fluid leaking from the incision site, and weren't told to expect that.  Fever of 100.4°F (38°C) or higher, or as directed by your provider.  Call 911  Call 911 right away if you have:   Trouble breathing  Facial swelling    If you have obstructive sleep apnea   You were given anesthesia medicine during surgery to keep you comfortable and free of pain. After surgery, you may have more apnea spells because of this medicine and other medicines you were given. The spells may last longer than normal.    At home:  Keep using the continuous positive airway pressure (CPAP) device when you sleep. Unless your healthcare provider tells you not to, use it when you sleep, day or night. CPAP is a common device used to treat obstructive sleep apnea.  Talk with your provider before taking any pain medicine, muscle relaxants, or sedatives. Your provider will tell you about the possible dangers of taking these medicines.  Contact your provider if your sleeping changes a lot even when taking medicines as directed.  E[employer] last reviewed this educational content on 10/1/2021  © 5341-3920 [employer]. All rights reserved. This information is not intended as a substitute for professional medical care. Always follow your healthcare professional's instructions.

## 2024-03-25 RX ORDER — FLUTICASONE PROPIONATE 50 MCG
2 SPRAY, SUSPENSION (ML) NASAL DAILY
COMMUNITY

## 2024-03-27 ENCOUNTER — ANESTHESIA EVENT (OUTPATIENT)
Dept: SURGERY | Facility: HOSPITAL | Age: 34
End: 2024-03-27
Payer: COMMERCIAL

## 2024-03-27 ENCOUNTER — HOSPITAL ENCOUNTER (OUTPATIENT)
Facility: HOSPITAL | Age: 34
Setting detail: HOSPITAL OUTPATIENT SURGERY
Discharge: HOME | End: 2024-03-27
Attending: STUDENT IN AN ORGANIZED HEALTH CARE EDUCATION/TRAINING PROGRAM | Admitting: STUDENT IN AN ORGANIZED HEALTH CARE EDUCATION/TRAINING PROGRAM
Payer: COMMERCIAL

## 2024-03-27 ENCOUNTER — ANESTHESIA (OUTPATIENT)
Dept: SURGERY | Facility: HOSPITAL | Age: 34
End: 2024-03-27
Payer: COMMERCIAL

## 2024-03-27 VITALS
HEART RATE: 73 BPM | OXYGEN SATURATION: 99 % | DIASTOLIC BLOOD PRESSURE: 72 MMHG | BODY MASS INDEX: 26.18 KG/M2 | HEIGHT: 76 IN | RESPIRATION RATE: 18 BRPM | TEMPERATURE: 97 F | WEIGHT: 215 LBS | SYSTOLIC BLOOD PRESSURE: 142 MMHG

## 2024-03-27 DIAGNOSIS — J34.89 NASAL OBSTRUCTION: ICD-10-CM

## 2024-03-27 DIAGNOSIS — J34.2 NASAL SEPTAL DEVIATION: ICD-10-CM

## 2024-03-27 DIAGNOSIS — J32.9 CHRONIC SINUSITIS, UNSPECIFIED LOCATION: ICD-10-CM

## 2024-03-27 DIAGNOSIS — J34.3 HYPERTROPHY OF BOTH INFERIOR NASAL TURBINATES: ICD-10-CM

## 2024-03-27 DIAGNOSIS — J33.9 NASAL POLYPOSIS: ICD-10-CM

## 2024-03-27 PROCEDURE — 88305 TISSUE EXAM BY PATHOLOGIST: CPT | Performed by: STUDENT IN AN ORGANIZED HEALTH CARE EDUCATION/TRAINING PROGRAM

## 2024-03-27 PROCEDURE — 09BL8ZZ EXCISION OF NASAL TURBINATE, VIA NATURAL OR ARTIFICIAL OPENING ENDOSCOPIC: ICD-10-PCS | Performed by: STUDENT IN AN ORGANIZED HEALTH CARE EDUCATION/TRAINING PROGRAM

## 2024-03-27 PROCEDURE — 09BR8ZZ EXCISION OF LEFT MAXILLARY SINUS, VIA NATURAL OR ARTIFICIAL OPENING ENDOSCOPIC: ICD-10-PCS | Performed by: STUDENT IN AN ORGANIZED HEALTH CARE EDUCATION/TRAINING PROGRAM

## 2024-03-27 PROCEDURE — 88304 TISSUE EXAM BY PATHOLOGIST: CPT | Performed by: STUDENT IN AN ORGANIZED HEALTH CARE EDUCATION/TRAINING PROGRAM

## 2024-03-27 PROCEDURE — 09TV8ZZ RESECTION OF LEFT ETHMOID SINUS, VIA NATURAL OR ARTIFICIAL OPENING ENDOSCOPIC: ICD-10-PCS | Performed by: STUDENT IN AN ORGANIZED HEALTH CARE EDUCATION/TRAINING PROGRAM

## 2024-03-27 PROCEDURE — 09TU8ZZ RESECTION OF RIGHT ETHMOID SINUS, VIA NATURAL OR ARTIFICIAL OPENING ENDOSCOPIC: ICD-10-PCS | Performed by: STUDENT IN AN ORGANIZED HEALTH CARE EDUCATION/TRAINING PROGRAM

## 2024-03-27 PROCEDURE — 8E09XBZ COMPUTER ASSISTED PROCEDURE OF HEAD AND NECK REGION: ICD-10-PCS | Performed by: STUDENT IN AN ORGANIZED HEALTH CARE EDUCATION/TRAINING PROGRAM

## 2024-03-27 PROCEDURE — 09BK8ZZ EXCISION OF NASAL MUCOSA AND SOFT TISSUE, VIA NATURAL OR ARTIFICIAL OPENING ENDOSCOPIC: ICD-10-PCS | Performed by: STUDENT IN AN ORGANIZED HEALTH CARE EDUCATION/TRAINING PROGRAM

## 2024-03-27 PROCEDURE — 09BM8ZZ EXCISION OF NASAL SEPTUM, VIA NATURAL OR ARTIFICIAL OPENING ENDOSCOPIC: ICD-10-PCS | Performed by: STUDENT IN AN ORGANIZED HEALTH CARE EDUCATION/TRAINING PROGRAM

## 2024-03-27 PROCEDURE — 09BQ8ZZ EXCISION OF RIGHT MAXILLARY SINUS, VIA NATURAL OR ARTIFICIAL OPENING ENDOSCOPIC: ICD-10-PCS | Performed by: STUDENT IN AN ORGANIZED HEALTH CARE EDUCATION/TRAINING PROGRAM

## 2024-03-27 DEVICE — PROPEL CONTOUR SINUS IMPLANT
Type: IMPLANTABLE DEVICE | Site: SINUS | Status: FUNCTIONAL
Brand: PROPEL CONTOUR

## 2024-03-27 RX ORDER — HYDROMORPHONE HYDROCHLORIDE 1 MG/ML
0.2 INJECTION, SOLUTION INTRAMUSCULAR; INTRAVENOUS; SUBCUTANEOUS EVERY 5 MIN PRN
Status: DISCONTINUED | OUTPATIENT
Start: 2024-03-27 | End: 2024-03-27

## 2024-03-27 RX ORDER — ONDANSETRON 2 MG/ML
INJECTION INTRAMUSCULAR; INTRAVENOUS AS NEEDED
Status: DISCONTINUED | OUTPATIENT
Start: 2024-03-27 | End: 2024-03-27 | Stop reason: SURG

## 2024-03-27 RX ORDER — NALOXONE HYDROCHLORIDE 0.4 MG/ML
80 INJECTION, SOLUTION INTRAMUSCULAR; INTRAVENOUS; SUBCUTANEOUS AS NEEDED
Status: DISCONTINUED | OUTPATIENT
Start: 2024-03-27 | End: 2024-03-27

## 2024-03-27 RX ORDER — ROCURONIUM BROMIDE 10 MG/ML
INJECTION, SOLUTION INTRAVENOUS AS NEEDED
Status: DISCONTINUED | OUTPATIENT
Start: 2024-03-27 | End: 2024-03-27 | Stop reason: SURG

## 2024-03-27 RX ORDER — HYDROMORPHONE HYDROCHLORIDE 1 MG/ML
0.4 INJECTION, SOLUTION INTRAMUSCULAR; INTRAVENOUS; SUBCUTANEOUS EVERY 5 MIN PRN
Status: DISCONTINUED | OUTPATIENT
Start: 2024-03-27 | End: 2024-03-27

## 2024-03-27 RX ORDER — MIDAZOLAM HYDROCHLORIDE 1 MG/ML
INJECTION INTRAMUSCULAR; INTRAVENOUS AS NEEDED
Status: DISCONTINUED | OUTPATIENT
Start: 2024-03-27 | End: 2024-03-27 | Stop reason: SURG

## 2024-03-27 RX ORDER — HYDROMORPHONE HYDROCHLORIDE 1 MG/ML
0.6 INJECTION, SOLUTION INTRAMUSCULAR; INTRAVENOUS; SUBCUTANEOUS EVERY 5 MIN PRN
Status: DISCONTINUED | OUTPATIENT
Start: 2024-03-27 | End: 2024-03-27

## 2024-03-27 RX ORDER — ACETAMINOPHEN 500 MG
1000 TABLET ORAL ONCE
Status: COMPLETED | OUTPATIENT
Start: 2024-03-27 | End: 2024-03-27

## 2024-03-27 RX ORDER — LIDOCAINE HYDROCHLORIDE AND EPINEPHRINE 10; 10 MG/ML; UG/ML
INJECTION, SOLUTION INFILTRATION; PERINEURAL AS NEEDED
Status: DISCONTINUED | OUTPATIENT
Start: 2024-03-27 | End: 2024-03-27

## 2024-03-27 RX ORDER — MORPHINE SULFATE 10 MG/ML
6 INJECTION, SOLUTION INTRAMUSCULAR; INTRAVENOUS EVERY 10 MIN PRN
Status: DISCONTINUED | OUTPATIENT
Start: 2024-03-27 | End: 2024-03-27

## 2024-03-27 RX ORDER — HYDROCODONE BITARTRATE AND ACETAMINOPHEN 5; 325 MG/1; MG/1
1 TABLET ORAL EVERY 6 HOURS PRN
Qty: 12 TABLET | Refills: 0 | Status: SHIPPED | OUTPATIENT
Start: 2024-03-27 | End: 2024-03-30

## 2024-03-27 RX ORDER — MORPHINE SULFATE 4 MG/ML
2 INJECTION, SOLUTION INTRAMUSCULAR; INTRAVENOUS EVERY 10 MIN PRN
Status: DISCONTINUED | OUTPATIENT
Start: 2024-03-27 | End: 2024-03-27

## 2024-03-27 RX ORDER — MORPHINE SULFATE 4 MG/ML
4 INJECTION, SOLUTION INTRAMUSCULAR; INTRAVENOUS EVERY 10 MIN PRN
Status: DISCONTINUED | OUTPATIENT
Start: 2024-03-27 | End: 2024-03-27

## 2024-03-27 RX ORDER — SODIUM CHLORIDE, SODIUM LACTATE, POTASSIUM CHLORIDE, CALCIUM CHLORIDE 600; 310; 30; 20 MG/100ML; MG/100ML; MG/100ML; MG/100ML
INJECTION, SOLUTION INTRAVENOUS CONTINUOUS
Status: DISCONTINUED | OUTPATIENT
Start: 2024-03-27 | End: 2024-03-27

## 2024-03-27 RX ORDER — GLYCOPYRROLATE 0.2 MG/ML
INJECTION, SOLUTION INTRAMUSCULAR; INTRAVENOUS AS NEEDED
Status: DISCONTINUED | OUTPATIENT
Start: 2024-03-27 | End: 2024-03-27 | Stop reason: SURG

## 2024-03-27 RX ORDER — DEXAMETHASONE SODIUM PHOSPHATE 4 MG/ML
VIAL (ML) INJECTION AS NEEDED
Status: DISCONTINUED | OUTPATIENT
Start: 2024-03-27 | End: 2024-03-27 | Stop reason: SURG

## 2024-03-27 RX ORDER — LIDOCAINE HYDROCHLORIDE 10 MG/ML
INJECTION, SOLUTION EPIDURAL; INFILTRATION; INTRACAUDAL; PERINEURAL AS NEEDED
Status: DISCONTINUED | OUTPATIENT
Start: 2024-03-27 | End: 2024-03-27 | Stop reason: SURG

## 2024-03-27 RX ADMIN — ROCURONIUM BROMIDE 20 MG: 10 INJECTION, SOLUTION INTRAVENOUS at 13:11:00

## 2024-03-27 RX ADMIN — SODIUM CHLORIDE, SODIUM LACTATE, POTASSIUM CHLORIDE, CALCIUM CHLORIDE: 600; 310; 30; 20 INJECTION, SOLUTION INTRAVENOUS at 14:29:00

## 2024-03-27 RX ADMIN — ROCURONIUM BROMIDE 10 MG: 10 INJECTION, SOLUTION INTRAVENOUS at 12:03:00

## 2024-03-27 RX ADMIN — ONDANSETRON 4 MG: 2 INJECTION INTRAMUSCULAR; INTRAVENOUS at 12:21:00

## 2024-03-27 RX ADMIN — GLYCOPYRROLATE 0.2 MG: 0.2 INJECTION, SOLUTION INTRAMUSCULAR; INTRAVENOUS at 12:21:00

## 2024-03-27 RX ADMIN — DEXAMETHASONE SODIUM PHOSPHATE 4 MG: 4 MG/ML VIAL (ML) INJECTION at 12:21:00

## 2024-03-27 RX ADMIN — ROCURONIUM BROMIDE 50 MG: 10 INJECTION, SOLUTION INTRAVENOUS at 12:21:00

## 2024-03-27 RX ADMIN — MIDAZOLAM HYDROCHLORIDE 2 MG: 1 INJECTION INTRAMUSCULAR; INTRAVENOUS at 12:18:00

## 2024-03-27 RX ADMIN — LIDOCAINE HYDROCHLORIDE 50 MG: 10 INJECTION, SOLUTION EPIDURAL; INFILTRATION; INTRACAUDAL; PERINEURAL at 12:21:00

## 2024-03-27 RX ADMIN — ROCURONIUM BROMIDE 10 MG: 10 INJECTION, SOLUTION INTRAVENOUS at 13:40:00

## 2024-03-27 NOTE — H&P
[facility]  History & Physical    Luis Casper Patient Status:  Hospital Outpatient Surgery    1990 MRN [id number]   Location [facility] PRE OP RECOVERY Attending Joon Rosenberg MD   Hosp Day # 0 PCP Fidel Guillen MD     History of Present Illness:    Luis Casper is a(n) 33 year old male who presents today for surgery per below.     History:  Past Medical History:   Diagnosis Date    Allergic rhinitis     Back problem     Disorder of thyroid     Exposure to medical diagnostic radiation     Lumbar stress fracture     Quadriceps muscle rupture, left, sequela     non surgical    Visual impairment      History reviewed. No pertinent surgical history.  Family History   Problem Relation Age of Onset    Other (bladder cancer) Father       reports that he has never smoked. He has never used smokeless tobacco. He reports current alcohol use of about 2.0 - 4.0 standard drinks of alcohol per week. He reports that he does not use drugs.    Allergies:  Allergies   Allergen Reactions    Penicillins UNKNOWN     As a child ;  unable to remember reactions .          Home Medications:  Medications Prior to Admission   Medication Sig Dispense Refill Last Dose    levothyroxine 150 MCG Oral Tab Take 1 tablet (150 mcg total) by mouth daily.   3/27/2024 at 0700    fluticasone propionate 50 MCG/ACT Nasal Suspension 2 sprays by Nasal route daily.       methylPREDNISolone 4 MG Oral Tablet Therapy Pack Take by oral route. 21 tablet 0        Physical Exam:   General: Alert, orientated x3.  Cooperative.  No apparent distress.    Pertinent exam findings may also be noted above in assessment and plan     Vital Signs:  /70 (BP Location: Right arm)   Pulse 83   Temp 97.9 °F (36.6 °C) (Oral)   Resp 16   Ht 6' 4\" (1.93 m)   Wt 215 lb (97.5 kg)   SpO2 97%   BMI 26.17 kg/m²   Skin Inspection - Normal.   Constitutional Overall appearance - Normal.   Head/Face Symmetric, TMJ tenderness not present     Eyes EOMI, PERRL   Right ear:  Canal clear, TM intact, no JESUS   Left ear:  Canal clear, TM intact, no JESUS   Nose: Septum midline, inferior turbinates not enlarged, nasal valves without collapse    Oral cavity/Oropharynx: No lesions or masses on inspection or palpation, tonsils symmetric    Neck: Soft without LAD, thyroid not enlarged  Voice clear/ no stridor       Laboratory Data:      Impression and Plan:  There is no problem list on file for this patient.    1. Nasal polyposis  33-year-old with a history of chronic sinonasal polyps causing nasal obstruction worse on the right side.  He saw the allergist in 2022 and they went over options including Dupixent and surgery.  The patient is now leaning more towards surgery.  He states that whenever he gets a cold such as from his child he suffers a lot with his sinuses for a number of weeks before he recovers.  A previous CT scan in 2022 demonstrated bilateral chronic sinusitis and nasal polyposis.  He has been using the budesonide sinus irrigations as well as Flonase for greater than 3 months without relief of these issues.  He was previously tried on a steroid burst and taper although symptoms returned after this has been stopped.     Anterior rhinoscopy with large polyp in the posterior right nasal cavity and septal deviation to the left     Patient has been suffering with chronic sinonasal polyposis and recurrent sinusitis.  Previous CT scan in 2022 with evidence of bilateral nasal polyps and pansinusitis.  Has not responded to budesonide irrigations and Flonase for greater than 3 months.  He has been tried on oral steroids in the past but his symptoms returned after they were stopped.  He had met with the allergist discussed their options for the polyps and he would like to proceed with surgery at this point.  Discussed the risk of surgery including recurrence of the polyps.  Will obtain an updated CT scan prior to the surgery.  May place steroid eluding stents  during the surgery to maximize the polyp shrinkage.  Discussed the need for budesonide irrigations after the surgery.  He was agreeable with the plan  The patient meets indications for endoscopic sinus surgery. This procedure was discussed including risks, benefits and alternatives. Risks include bleeding, infection, orbital injury including blindness, CSF leak including meningitis, scarring, need for post operative debridement and irrigation, risks of general anesthesia, need for additional procedures and persistent symptoms. They understand, all questions were answered and would like to proceed.   - CT SINUS Pending sale to [facility] (CPT=70486); Future     2. Nasal septal deviation     The patient meets indications for septoplasty and inferior turbinate reduction. These procedures were discussed including risks, benefits and alternatives. Risks include bleeding, infection, septal perforation, CSF leak, risks of general anesthesia, need for additional procedures and persistent symptoms. They understand, all questions were answered and would like to proceed.      3. Nasal obstruction     4. Hypertrophy of both inferior nasal turbinates        5. Chronic sinusitis, unspecified location    3/27/24: Patient presents for OR. No changes since clinic. Consent obtained.         Joon Rosenberg MD  3/27/2024  12:00 PM

## 2024-03-27 NOTE — ANESTHESIA PROCEDURE NOTES
Airway  Date/Time: 3/27/2024 12:38 PM  Urgency: Elective      General Information and Staff    Patient location during procedure: OR  Anesthesiologist: Doni Umana MD  Performed: anesthesiologist   Performed by: Doni Umana MD  Authorized by: Doni Umana MD      Indications and Patient Condition  Indications for airway management: anesthesia  Sedation level: deep  Preoxygenated: yes  Patient position: sniffing  Mask difficulty assessment: 1 - vent by mask    Final Airway Details  Final airway type: endotracheal airway      Successful airway: ETT  Cuffed: yes   Successful intubation technique: direct laryngoscopy  Endotracheal tube insertion site: oral  Blade: [place]  Blade size: #3  ETT size (mm): 7.5    Placement verified by: capnometry   Measured from: teeth  Number of attempts at approach: 1

## 2024-03-27 NOTE — BRIEF OP NOTE
Pre-Operative Diagnosis: Nasal polyposis [J33.9]  Hypertrophy of both inferior nasal turbinates [J34.3]  Nasal septal deviation [J34.2]  Nasal obstruction [J34.89]  Chronic sinusitis, unspecified location [J32.9]     Post-Operative Diagnosis: Nasal polyposis [J33.9]Hypertrophy of both inferior nasal turbinates [J34.3]Nasal septal deviation [J34.2]Nasal obstruction [J34.89]Chronic sinusitis, unspecified location [J32.9]      Procedure Performed:   [employer] endoscopic bilateral maxillary antrostomy with tissue removal, endoscopic bilateral total ethmoidectomy, endoscopic bilateral sphenoid, endoscopic bilateral frontal, endoscopic nasal polypectomy, endoscopic nasal polypectomy, bilateral septoplasty, submucosal turbinate reduction, bilateral frontal sphenoid balloon, bilateral sinuva stents    Surgeon(s) and Role:     * Joon Rosenberg MD - Primary    Assistant(s):        Surgical Findings: Severe septal spur jutting on the left into the IT and MT. 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

## 2024-03-27 NOTE — ANESTHESIA PREPROCEDURE EVALUATION
Anesthesia PreOp Note    HPI:     Luis Casper is a 33 year old male who presents for preoperative consultation requested by: Joon Rosenberg MD    Date of Surgery: 3/27/2024    Procedure(s):  Medtronic endoscopic bilateral maxillary antrostomy with tissue removal, endoscopic bilateral total ethmoidectomy, endoscopic bilateral sphenoid, endoscopic bilateral frontal, endoscopic nasal polypectomy, endoscopic nasal polypectomy, bilateral septoplasty, submucosal turbinate reduction, bilateral frontal sphenoid balloon, bilateral sinuva stents  Indication: Nasal polyposis [J33.9]  Hypertrophy of both inferior nasal turbinates [J34.3]  Nasal septal deviation [J34.2]  Nasal obstruction [J34.89]  Chronic sinusitis, unspecified location [J32.9]    Relevant Problems   No relevant active problems       NPO:  Last Liquid Consumption Date: 03/26/24  Last Liquid Consumption Time: 1800  Last Solid Consumption Date: 03/26/24  Last Solid Consumption Time: 1800  Last Liquid Consumption Date: 03/26/24          History Review:  There are no problems to display for this patient.      Past Medical History:   Diagnosis Date    Allergic rhinitis     Back problem     Disorder of thyroid     Exposure to medical diagnostic radiation     Lumbar stress fracture     Quadriceps muscle rupture, left, sequela     non surgical    Visual impairment        History reviewed. No pertinent surgical history.    Medications Prior to Admission   Medication Sig Dispense Refill Last Dose    levothyroxine 150 MCG Oral Tab Take 1 tablet (150 mcg total) by mouth daily.   3/27/2024 at 0700    fluticasone propionate 50 MCG/ACT Nasal Suspension 2 sprays by Nasal route daily.       methylPREDNISolone 4 MG Oral Tablet Therapy Pack Take by oral route. 21 tablet 0      Current Facility-Administered Medications Ordered in Epic   Medication Dose Route Frequency Provider Last Rate Last Admin    lactated ringers infusion   Intravenous Continuous Joon Rosenberg MD 20  mL/hr at 03/27/24 1126 New Bag at 03/27/24 1126     No current Epic-ordered outpatient medications on file.       Allergies   Allergen Reactions    Penicillins UNKNOWN     As a child ;  unable to remember reactions .          Family History   Problem Relation Age of Onset    Other (bladder cancer) Father      Social History     Socioeconomic History    Marital status:    Tobacco Use    Smoking status: Never    Smokeless tobacco: Never   Vaping Use    Vaping Use: Never used   Substance and Sexual Activity    Alcohol use: Yes     Alcohol/week: 2.0 - 4.0 standard drinks of alcohol     Types: 2 - 4 Glasses of wine per week    Drug use: Never    Sexual activity: Yes       Available pre-op labs reviewed.             Vital Signs:  Body mass index is 26.17 kg/m².   height is 1.93 m (6' 4\") and weight is 97.5 kg (215 lb). His oral temperature is 97.9 °F (36.6 °C). His blood pressure is 140/70 and his pulse is 83. His respiration is 16 and oxygen saturation is 97%.   Vitals:    03/25/24 1655 03/27/24 1109   BP:  140/70   Pulse:  83   Resp:  16   Temp:  97.9 °F (36.6 °C)   TempSrc:  Oral   SpO2:  97%   Weight: 102.1 kg (225 lb) 97.5 kg (215 lb)   Height: 1.93 m (6' 4\")         Anesthesia Evaluation     Patient summary reviewed and Nursing notes reviewed    No history of anesthetic complications   Airway   Mallampati: II  Neck ROM: full  Dental      Pulmonary - negative ROS and normal exam   Cardiovascular - negative ROS and normal exam    Neuro/Psych - negative ROS     GI/Hepatic/Renal - negative ROS     Endo/Other - negative ROS   Abdominal  - normal exam                 Anesthesia Plan:   ASA:  2  Plan:   General  Airway:  ETT  Post-op Pain Management: IV analgesics  Informed Consent Plan and Risks Discussed With:  Patient      I have informed Luis Casper and/or legal guardian or family member of the nature of the anesthetic plan, benefits, risks including possible dental damage if relevant, major  complications, and any alternative forms of anesthetic management.   All of the patient's questions were answered to the best of my ability. The patient desires the anesthetic management as planned.  TIBURCIO LAUREANO MD  3/27/2024 11:29 AM  Present on Admission:  **None**

## 2024-03-27 NOTE — ANESTHESIA POSTPROCEDURE EVALUATION
Patient: Luis Casper    Procedure Summary       Date: 03/27/24 Room / Location: [facility] MAIN OR  / [facility] MAIN OR    Anesthesia Start: 1218 Anesthesia Stop: 1447    Procedure: [employer]tr[employer] endoscopic bilateral maxillary antrostomy with tissue removal, endoscopic bilateral total ethmoidectomy, endoscopic bilateral sphenoid, endoscopic bilateral frontal, endoscopic nasal polypectomy, endoscopic nasal polypectomy, bilateral septoplasty, submucosal turbinate reduction, bilateral frontal sphenoid balloon, bilateral sinuva stents (Bilateral: Nose) Diagnosis:       Nasal polyposis      Hypertrophy of both inferior nasal turbinates      Nasal septal deviation      Nasal obstruction      Chronic sinusitis, unspecified location      (Nasal polyposis [J33.9]Hypertrophy of both inferior nasal turbinates [J34.3]Nasal septal deviation [J34.2]Nasal obstruction [J34.89]Chronic sinusitis, unspecified location [J32.9])    Surgeons: Joon Rosenberg MD Anesthesiologist: Tiburcio Laureano MD    Anesthesia Type: general ASA Status: 2            Anesthesia Type: general    Vitals Value Taken Time   /73 03/27/24 1451   Temp 97.6 °F (36.4 °C) 03/27/24 1441   Pulse 91 03/27/24 1454   Resp 15 03/27/24 1454   SpO2 96 % 03/27/24 1454   Vitals shown include unfiled device data.    [facility] AN Post Evaluation:   Patient Evaluated in PACU  Patient Participation: complete - patient participated  Level of Consciousness: awake  Pain Management: adequate  Airway Patency:patent  Dental exam unchanged from preop  Yes    Cardiovascular Status: acceptable  Respiratory Status: acceptable  Postoperative Hydration acceptable      TIBURCIO LAUREANO MD  3/27/2024 2:54 PM

## 2024-03-28 ENCOUNTER — TELEPHONE (OUTPATIENT)
Dept: OTOLARYNGOLOGY | Facility: CLINIC | Age: 34
End: 2024-03-28

## 2024-03-28 NOTE — OPERATIVE REPORT
DATE OF SURGERY: 3/27/24  PREOPERATIVE DIAGNOSIS:   1.  Chronic rhino sinusitis   2.  Inferior turbinate hypertrophy  3.  Septal deviation  4.  Nasal polyposis  POSTOPERATIVE DIAGNOSIS: Same.  OPERATIVE PROCEDURE:    1.  Endoscopic sinus surgery with image guidance  2.  Septoplasty  3.  Bilateral maxillary antrostomy with removal of tissue  4.  Bilateral total ethmoidectomy with removal of tissue  5.  Bilateral removal of nasal polyps  6.  Inferior turbinate reduction bilateral with microdebrider  SURGEON: Joon Rosenberg M.D.  (Otolaryngology)  Assistant: n/a    ANESTHESIA:  GENERAL.  FINDINGS: Severe septal spur jutting on the left into the IT and MT. 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 They underwent an uneventful intubation.    The eyes were taped and was positioned in usual fashion for endoscopic sinus surgery.  Image guidance was then arranged and registration performed with adequate accuracy with a less than 2mm variance achieved. The patient was then draped in usual fashion.  A timeout was performed and all parties were in agreement.    I began placing Afrin-soaked pledgets into each nasal cavity.  I then remove these after several minutes to allow adequate decongestion.  Then turned my attention to the endoscopic sinus surgery portion of the case. I outfractured the inferior turbinates with a [place].    I began with a directed septoplasty on the left side to remove the significant left septal spur.  I used a caudal to incise a longitudinal incision overlying the spur.  I elevated the mucoperichondrial flaps and remove the spur with a caudal and through-cutting instrumentation.  I laid the flaps  back down.   I then turned my attention to the endoscopic maxillary antrostomies.  Starting on the left , I then infractured the middle turbinate to gain access to the middle meatus.  There was a large amount of polypoid material extruding from the middle meatus that was removed with a microdebrider.  I then identified the uncinate process and medialized the free edge with a ball-tipped probe.  I then remove the uncinate process with a backbiter and microdebrider.  I then identified the maxillary os and entered this with a curved image guided suction.  I then opened the maxillary os with side biting and backbiting instruments including the microdebrider until an accurate maxillary opening was created.  I then removed any diseased mucosa or polypoid material within the maxillary sinus with instrumentation.  Then irrigated the sinus clear.  I then inserted a propel contour steroid eluding stent into the sinus at the conclusion of the case.  I then entered into the ethmoid bulla on the left side and took this down with through-cutting and microdebrider instrumentation.  The bone of the ethmoid bulla was quite thickened from osteitis.  The basal lamella was entered at its medial and inferior aspect.  The ethmoid air cells were then removed with through-cutting and microdebrider instrumentation.  Again the bone in the ethmoids was quite thickened.  Once the ethmoid cavity was opened up adequately a Sinuva implant was inserted.  I then performed a mirror procedure on the contralateral side.  The polypoid disease seem to be worse on the right side the maxillary sinus on the right was full of polyp and mucopurulent material that was removed.  Within the right nasal cavity there was a large polyp that was extending all the way into the nasopharynx filling the entire nasal cavity almost on the right side.  This was removed with a microdebrider instrumentation.  Following this I performed inferior turbinate reduction with the  microdebrider.  I injected approximately 1 cc of 1% lidocaine with 1 to 100,000 epinephrine into the head of each inferior turbinate.  I then made a stab incision with the debrider at the head of the turbinate and worked from anterior to posterior to perform submucous tissue resection with the microdebrider.  Following this I then outfractured the inferior turbinates.  I again suctioned out the nasopharynx and assured hemostasis.  I then suctioned out the stomach with an OG tube.  The patient was then handed over to anesthesia who successfully extubated the patient and returned them to PACU in stable fashion.  There was no persistent hemostasis.  Their vision was evaluated in PACU and seen to be intact.  Estimated blood loss: 150 cc  Implants or drains: Propel steroid eluding stents into maxillary cavity. Sinuva in each ethmoid cavity  Specimen: Sinus contents

## 2024-03-28 NOTE — TELEPHONE ENCOUNTER
Pt is post op day 1 endo maxillary with tissue removal, endo total ethmoidectomy, endo sphenoid, endo polypectomy , septoplasty, SMR. Per  Pt pt is doing well no bleeding, advised pt no bending or heavy lifting for the next week and pt is not to blow nose until seen by Dr Rosenberg. Advised pt she can start using saline nasal irrigations, afrin up to 5 days post op. Reviewed post op medications. Advised pt to contact our office if any increased bleeding  (saturating more than 4 mustache dressings within the hour) or fever greater than 102. Pt verbalized understanding.

## 2024-04-03 ENCOUNTER — OFFICE VISIT (OUTPATIENT)
Dept: OTOLARYNGOLOGY | Facility: CLINIC | Age: 34
End: 2024-04-03
Payer: COMMERCIAL

## 2024-04-03 DIAGNOSIS — J32.9 CHRONIC SINUSITIS, UNSPECIFIED LOCATION: Primary | ICD-10-CM

## 2024-04-03 DIAGNOSIS — J33.9 NASAL POLYPS: ICD-10-CM

## 2024-04-03 PROCEDURE — 99024 POSTOP FOLLOW-UP VISIT: CPT | Performed by: STUDENT IN AN ORGANIZED HEALTH CARE EDUCATION/TRAINING PROGRAM

## 2024-04-03 PROCEDURE — 31237 NSL/SINS NDSC SURG BX POLYPC: CPT | Performed by: STUDENT IN AN ORGANIZED HEALTH CARE EDUCATION/TRAINING PROGRAM

## 2024-04-03 RX ORDER — BUDESONIDE 1 MG/2ML
1 INHALANT ORAL 2 TIMES DAILY
Qty: 60 EACH | Refills: 5 | Status: SHIPPED | OUTPATIENT
Start: 2024-04-03 | End: 2024-07-02

## 2024-04-03 NOTE — PROGRESS NOTES
Luis Casper is a 33 year old male.   Chief Complaint   Patient presents with    Post-Op     Poat-op sinus surgery  Pt reports feeling well after surgery       ASSESSMENT AND PLAN:   1. Chronic sinusitis, unspecified location  33-year-old status post septoplasty and endoscopic sinus surgery last week for nasal polyps and septal deviation.  He is doing well he is feeling that he can breathe better and that his smell is returning    On exam it appears that he is healing well.  Had a small synechiae between the inferior turbinate and the septum that was divided.  The stents are in place no signs of infection    Had a detailed discussion about polyps and long-term control.  He is going to look into depictions with an allergist.  Will also prescribe budesonide sinus irrigations for him to use.  Will like to see him back in 2 weeks for repeat debridement.  So far he is healing well    2. Nasal polyps        The patient indicates understanding of these issues and agrees to the plan.      EXAM:   There were no vitals taken for this visit.    Pertinent exam findings may also be noted above in assessment and plan     System Details   Skin Inspection - Normal.   Constitutional Overall appearance - Normal.   Head/Face Symmetric, TMJ tenderness not present    Eyes EOMI, PERRL   Right ear:  Canal clear, TM intact, no JESUS   Left ear:  Canal clear, TM intact, no JESUS   Nose: Septum midline, inferior turbinates not enlarged, nasal valves without collapse    Oral cavity/Oropharynx: No lesions or masses on inspection or palpation, tonsils symmetric    Neck: Soft without LAD, thyroid not enlarged  Voice clear/ no stridor   Other:      Scopes and Procedures:     Bilateral Nasal Endoscopy and sinus debridement procedure Note     Need for debridement of crusting and mucus from bilateral nasal and sinus cavities status post endoscopic sinus surgery.  As well as inspection of the healing of the sinus cavities.    Due to inability for  adequate examination of the nose and nasopharynx and need for magnification to perform the examination, endoscopy was performed.  Risks and benefits were discussed with patient/family and they have given verbal consent to proceed.    Pre-operative Diagnosis:   Chronic sinusitis, unspecified location  (primary encounter diagnosis)    Post-operative Diagnosis: Same    Procedure: Diagnostic nasal endoscopy and sinonasal debridement    Anesthesia: Topical anesthetic [place]     Surgeon Joon Rosenberg MD    EBL: 0cc    Procedure Detail & Findings:     After placement of topical anesthetic intranasally the endoscope was inserted into each nares and driven through the nasal cavity into the nasopharynx. The following findings were noted:    Septum: Midline   Inferior turbinates: Normal  Middle meatus: Patent no purulence.   Middle turbinates: Normal  Purulence: None noted  Polyps: None noted  Nasopharynx and eustachian tube: No masses  Other: Crusting and debris debrided from bilateral sinonasal cavities with instrumentation and suction    Condition: Stable    Complications: Patient tolerated the procedure well with no immediate complication.    Joon Rosenberg MD        Current Outpatient Medications   Medication Sig Dispense Refill    Budesonide 1 MG/2ML Inhalation Suspension Take 2 mL (1 mg total) by nebulization 2 (two) times daily. Mix 1 raspule with the 240cc of saline as instructed and irrigate 1/2 of bottle on each side of nose 1-2 times a day. 60 each 5    fluticasone propionate 50 MCG/ACT Nasal Suspension 2 sprays by Nasal route daily.      levothyroxine 150 MCG Oral Tab Take 1 tablet (150 mcg total) by mouth daily.        Past Medical History:   Diagnosis Date    Allergic rhinitis     Back problem     Disorder of thyroid     Exposure to medical diagnostic radiation     Lumbar stress fracture     Quadriceps muscle rupture, left, sequela     non surgical    Visual impairment       Social History:  Social History      Socioeconomic History    Marital status:    Tobacco Use    Smoking status: Never    Smokeless tobacco: Never   Vaping Use    Vaping Use: Never used   Substance and Sexual Activity    Alcohol use: Yes     Alcohol/week: 2.0 - 4.0 standard drinks of alcohol     Types: 2 - 4 Glasses of wine per week    Drug use: Never    Sexual activity: Yes   Social History Narrative    Works in Finance.    , no children.  Wife in marketing.    Bikes for exercise.    Golfs.    Former college football kicker          Joon Rosenberg MD  4/3/2024  10:29 AM

## 2024-04-17 ENCOUNTER — OFFICE VISIT (OUTPATIENT)
Dept: OTOLARYNGOLOGY | Facility: CLINIC | Age: 34
End: 2024-04-17
Payer: COMMERCIAL

## 2024-04-17 DIAGNOSIS — J32.9 CHRONIC SINUSITIS, UNSPECIFIED LOCATION: Primary | ICD-10-CM

## 2024-04-17 DIAGNOSIS — J33.9 NASAL POLYPS: ICD-10-CM

## 2024-04-17 PROCEDURE — 31237 NSL/SINS NDSC SURG BX POLYPC: CPT | Performed by: STUDENT IN AN ORGANIZED HEALTH CARE EDUCATION/TRAINING PROGRAM

## 2024-04-17 PROCEDURE — 99024 POSTOP FOLLOW-UP VISIT: CPT | Performed by: STUDENT IN AN ORGANIZED HEALTH CARE EDUCATION/TRAINING PROGRAM

## 2024-04-17 NOTE — PROGRESS NOTES
Luis Casper is a 33 year old male.   Chief Complaint   Patient presents with    Follow - Up     Repeat debridement of sinus        ASSESSMENT AND PLAN:   1. Chronic sinusitis, unspecified location  33-year-old presents after endoscopic sinus surgery for nasal polyps for second postoperative visit.  He is doing doing well.  Had a mild right headache for a couple days has gone away.  Smell has returned    On exam he had crusting in bilateral sinonasal cavities that was removed.  Sinuva stents in place minimal overlying crusting    Good result, doing well.  Will see him back in 6 weeks.  Discussed Dupixent will see if there is someone locally in the area that he may be able to see.  Should continue with the irrigations in the meantime    2. Nasal polyps        The patient indicates understanding of these issues and agrees to the plan.      EXAM:   There were no vitals taken for this visit.    Pertinent exam findings may also be noted above in assessment and plan     System Details   Skin Inspection - Normal.   Constitutional Overall appearance - Normal.   Head/Face Symmetric, TMJ tenderness not present    Eyes EOMI, PERRL   Right ear:  Canal clear, TM intact, no JESUS   Left ear:  Canal clear, TM intact, no JESUS   Nose: Septum midline, inferior turbinates not enlarged, nasal valves without collapse    Oral cavity/Oropharynx: No lesions or masses on inspection or palpation, tonsils symmetric    Neck: Soft without LAD, thyroid not enlarged  Voice clear/ no stridor   Other:      Scopes and Procedures:     Bilateral Nasal Endoscopy and sinus debridement procedure Note     Need for debridement of crusting and mucus from bilateral nasal and sinus cavities status post endoscopic sinus surgery.  As well as inspection of the healing of the sinus cavities.    Due to inability for adequate examination of the nose and nasopharynx and need for magnification to perform the examination, endoscopy was performed.  Risks and  benefits were discussed with patient/family and they have given verbal consent to proceed.    Pre-operative Diagnosis:   Chronic sinusitis, unspecified location  (primary encounter diagnosis)    Post-operative Diagnosis: Same    Procedure: Diagnostic nasal endoscopy and sinonasal debridement    Anesthesia: Topical anesthetic [place]     Surgeon Joon Rosenberg MD    EBL: 0cc    Procedure Detail & Findings:     After placement of topical anesthetic intranasally the endoscope was inserted into each nares and driven through the nasal cavity into the nasopharynx. The following findings were noted:    Septum: Midline   Inferior turbinates: Normal  Middle meatus: Patent no purulence.   Middle turbinates: Normal  Purulence: None noted  Polyps: None noted  Nasopharynx and eustachian tube: No masses  Other: Crusting and debris debrided from bilateral sinonasal cavities with instrumentation and suction    Condition: Stable    Complications: Patient tolerated the procedure well with no immediate complication.    Joon Rosenberg MD        Current Outpatient Medications   Medication Sig Dispense Refill    Budesonide 1 MG/2ML Inhalation Suspension Take 2 mL (1 mg total) by nebulization 2 (two) times daily. Mix 1 raspule with the 240cc of saline as instructed and irrigate 1/2 of bottle on each side of nose 1-2 times a day. 60 each 5    levothyroxine 150 MCG Oral Tab Take 1 tablet (150 mcg total) by mouth daily.      fluticasone propionate 50 MCG/ACT Nasal Suspension 2 sprays by Nasal route daily. (Patient not taking: Reported on 4/17/2024)        Past Medical History:    Allergic rhinitis    Back problem    Disorder of thyroid    Exposure to medical diagnostic radiation    Lumbar stress fracture    Quadriceps muscle rupture, left, sequela    non surgical    Visual impairment      Social History:  Social History     Socioeconomic History    Marital status:    Tobacco Use    Smoking status: Never    Smokeless tobacco: Never   Vaping  Use    Vaping status: Never Used   Substance and Sexual Activity    Alcohol use: Yes     Alcohol/week: 2.0 - 4.0 standard drinks of alcohol     Types: 2 - 4 Glasses of wine per week    Drug use: Never    Sexual activity: Yes   Social History Narrative    Works in Finance.    , no children.  Wife in marketing.    Bikes for exercise.    Golfs.    Former college football kicker          Joon Rosenberg MD  4/17/2024  10:55 AM

## 2024-06-12 ENCOUNTER — OFFICE VISIT (OUTPATIENT)
Dept: OTOLARYNGOLOGY | Facility: CLINIC | Age: 34
End: 2024-06-12
Payer: COMMERCIAL

## 2024-06-12 DIAGNOSIS — J33.9 NASAL POLYPS: Primary | ICD-10-CM

## 2024-06-12 PROCEDURE — 99024 POSTOP FOLLOW-UP VISIT: CPT | Performed by: STUDENT IN AN ORGANIZED HEALTH CARE EDUCATION/TRAINING PROGRAM

## 2024-06-12 PROCEDURE — 31237 NSL/SINS NDSC SURG BX POLYPC: CPT | Performed by: STUDENT IN AN ORGANIZED HEALTH CARE EDUCATION/TRAINING PROGRAM

## 2024-06-12 NOTE — PROGRESS NOTES
Luis Casper is a 33 year old male.   Chief Complaint   Patient presents with    Post-Op     Endoscopic Nasal surgery done on 3/27/24, per pt no complaints        ASSESSMENT AND PLAN:   1. Nasal polyps  33-year-old status post bilateral endoscopic sinus surgery for nasal polyps about 3 months ago.  Doing well his smell has returned and breathing much better through his nose    On exam the remnants of the Sinuva stents were removed from each side.  The left sinonasal cavity appears nice with minimal polypoid edema.  The right side there is a little bit of polypoid edema in the middle meatus but for the most part the sinonasal cavity is clear    He is doing well with the remnants of the Sinuva stents were removed.  Encouraged to get an allergy consultation regarding Dupixent.  He has enough of the budesonide irrigations he does this every Monday.  Discussed return precautions.  Can see me back as needed    The patient indicates understanding of these issues and agrees to the plan.      EXAM:   There were no vitals taken for this visit.    Pertinent exam findings may also be noted above in assessment and plan     System Details   Skin Inspection - Normal.   Constitutional Overall appearance - Normal.   Head/Face Symmetric, TMJ tenderness not present    Eyes EOMI, PERRL   Right ear:  Canal clear, TM intact, no JESUS   Left ear:  Canal clear, TM intact, no JESUS   Nose: Septum midline, inferior turbinates not enlarged, nasal valves without collapse    Oral cavity/Oropharynx: No lesions or masses on inspection or palpation, tonsils symmetric    Neck: Soft without LAD, thyroid not enlarged  Voice clear/ no stridor   Other:      Scopes and Procedures:     Bilateral Nasal Endoscopy and sinus debridement procedure Note     Need for debridement of crusting and mucus from bilateral nasal and sinus cavities status post endoscopic sinus surgery.  As well as inspection of the healing of the sinus cavities.    Due to inability  for adequate examination of the nose and nasopharynx and need for magnification to perform the examination, endoscopy was performed.  Risks and benefits were discussed with patient/family and they have given verbal consent to proceed.    Pre-operative Diagnosis:   Chronic sinusitis, unspecified location  (primary encounter diagnosis)    Post-operative Diagnosis: Same    Procedure: Diagnostic nasal endoscopy and sinonasal debridement    Anesthesia: Topical anesthetic [place]     Surgeon Joon Rosenberg MD    EBL: 0cc    Procedure Detail & Findings:     After placement of topical anesthetic intranasally the endoscope was inserted into each nares and driven through the nasal cavity into the nasopharynx. The following findings were noted:    Septum: Midline   Inferior turbinates: Normal  Middle meatus: Patent no purulence.   Middle turbinates: Normal  Purulence: None noted  Polyps: None noted  Nasopharynx and eustachian tube: No masses  Other: Crusting and debris debrided from bilateral sinonasal cavities with instrumentation and suction    Condition: Stable    Complications: Patient tolerated the procedure well with no immediate complication.    Joon Rosenberg MD        Current Outpatient Medications   Medication Sig Dispense Refill    Budesonide 1 MG/2ML Inhalation Suspension Take 2 mL (1 mg total) by nebulization 2 (two) times daily. Mix 1 raspule with the 240cc of saline as instructed and irrigate 1/2 of bottle on each side of nose 1-2 times a day. 60 each 5    fluticasone propionate 50 MCG/ACT Nasal Suspension 2 sprays by Nasal route daily.      levothyroxine 150 MCG Oral Tab Take 1 tablet (150 mcg total) by mouth daily.        Past Medical History:    Allergic rhinitis    Back problem    Disorder of thyroid    Exposure to medical diagnostic radiation    Lumbar stress fracture    Quadriceps muscle rupture, left, sequela    non surgical    Visual impairment      Social History:  Social History     Socioeconomic History     Marital status:    Tobacco Use    Smoking status: Never    Smokeless tobacco: Never   Vaping Use    Vaping status: Never Used   Substance and Sexual Activity    Alcohol use: Yes     Alcohol/week: 2.0 - 4.0 standard drinks of alcohol     Types: 2 - 4 Glasses of wine per week    Drug use: Never    Sexual activity: Yes   Social History Narrative    Works in Finance.    , no children.  Wife in marketing.    Bikes for exercise.    Golfs.    Former college football kicker          Joon Rosenberg MD  6/12/2024  10:15 AM

## 2024-07-10 ENCOUNTER — OFFICE VISIT (OUTPATIENT)
Dept: SURGERY | Facility: CLINIC | Age: 34
End: 2024-07-10

## 2024-07-10 VITALS
BODY MASS INDEX: 27.4 KG/M2 | HEART RATE: 84 BPM | WEIGHT: 225 LBS | SYSTOLIC BLOOD PRESSURE: 111 MMHG | HEIGHT: 76 IN | DIASTOLIC BLOOD PRESSURE: 72 MMHG

## 2024-07-10 DIAGNOSIS — Z30.09 VASECTOMY EVALUATION: Primary | ICD-10-CM

## 2024-07-10 PROCEDURE — 99244 OFF/OP CNSLTJ NEW/EST MOD 40: CPT | Performed by: UROLOGY

## 2024-07-10 PROCEDURE — 3078F DIAST BP <80 MM HG: CPT | Performed by: UROLOGY

## 2024-07-10 PROCEDURE — 3074F SYST BP LT 130 MM HG: CPT | Performed by: UROLOGY

## 2024-07-10 PROCEDURE — 3008F BODY MASS INDEX DOCD: CPT | Performed by: UROLOGY

## 2024-07-10 RX ORDER — DIAZEPAM 5 MG/1
15 TABLET ORAL ONCE
Qty: 3 TABLET | Refills: 0 | Status: SHIPPED | OUTPATIENT
Start: 2024-07-10 | End: 2024-07-10

## 2024-07-10 RX ORDER — HYDROCODONE BITARTRATE AND ACETAMINOPHEN 5; 325 MG/1; MG/1
2 TABLET ORAL
Qty: 2 TABLET | Refills: 0 | Status: SHIPPED | OUTPATIENT
Start: 2024-07-10 | End: 2024-07-10

## 2024-07-10 NOTE — PROGRESS NOTES
[facility]  Initial Office Consultation    HPI:   Luis Casper is a 33 year old male here today for consultation at the request of, and a copy of this note will be sent to, Fidel Guillen MD.    Patient presents today in consultation for further evaluation and counseling regarding a bilateral vasectomy as a method of permanent sterility and family planning. He has 2 children (2-year-old daughter and 2 months old son). He is sexually active with his wife (34) and has no other sexual partners. They currently use breast-feeding for contraception.  They usually use condoms he desires to have a vasectomy for permanent sterility.     He denies fevers or chills, change in appetite, fatigue, or unintentional weight loss.  He denies dysuria, gross hematuria, frequency, urgency, nocturia, or flank pain.      PAST MEDICAL HISTORY: Graves' disease.    PAST SURGICAL HISTORY: Thyroidectomy.    SOCIAL HISTORY:  and has 2 children.  No smoking or illicit drug use.  Social alcohol.  Works a desk job.     FAMILY HISTORY: No family history of prostate cancer or  malignancies.     Allergies: Penicillins      REVIEW OF SYSTEMS:  Pertinent positives and negatives per HPI. A 10-point ROS was performed and is otherwise negative.       EXAM:  /72 (BP Location: Left arm, Patient Position: Sitting, Cuff Size: large)   Pulse 84   Ht 6' 4\" (1.93 m)   Wt 225 lb (102.1 kg)   BMI 27.39 kg/m²     Physical Exam  Constitutional:       Appearance: He is well-developed.   HENT:      Head: Normocephalic.   Eyes:      General: No scleral icterus.  Cardiovascular:      Rate and Rhythm: Normal rate.   Pulmonary:      Effort: Pulmonary effort is normal.   Abdominal:      General: There is no distension.      Palpations: Abdomen is soft.      Tenderness: There is no abdominal tenderness.   Genitourinary:     Penis: Circumcised.       Testes: Normal.      Epididymis:      Right: Normal.      Left:  Normal.      Comments: Normal spermatic cord structures bilaterally.  Vasa palpable bilaterally without significant manipulation.  Normal thickness scrotal skin.  No varicoceles or hydroceles.  Skin:     General: Skin is warm and dry.   Neurological:      Mental Status: He is alert and oriented to person, place, and time.   Psychiatric:         Mood and Affect: Mood normal.         Behavior: Behavior normal.        LABS:  No results found.       IMAGING:  No results found.       IMPRESSION:  33 year old male here for evaluation and counseling regarding a bilateral vasectomy as a permanent method of sterility and family planning.     I had a lengthy discussion with the patient regarding vasectomy as a method of permanent sterility and family planning. We discussed the indications, benefits, risks, technique of vas occlusion, possible complications and alternatives, with emphasis on the following points:    Vasectomy is intended to be a permanent form of contraception.    Vasectomy does not produce immediate sterility.    Following vasectomy, another form of contraception is required until vas occlusion is confirmed by post- vasectomy semen analysis (PVSA).    Even after vas occlusion is confirmed, vasectomy is not 100% reliable in preventing pregnancy.    The risk of pregnancy after vasectomy is approximately 1 in 2,000 for men who have post-vasectomy azoospermia or PVSA showing rare non-motile sperm (RNMS).    Repeat vasectomy is necessary in ?1% of vasectomies, provided that a technique for vas occlusion known to have a low occlusive failure rate has been used.    Patients should refrain from ejaculation for approximately one week after vasectomy.    Options for fertility after vasectomy include vasectomy reversal and sperm retrieval with in vitro fertilization. These options are not always successful, and they may be expensive.    The rates of surgical complications such as symptomatic hematoma and infection are  1-2%. These rates vary with the surgeon's experience and the criteria used to diagnose these conditions.    Chronic scrotal pain associated with negative impact on quality of life occurs after vasectomy in about 1-2% of men. Few of these men require additional surgery.    Other permanent and non-permanent alternatives to vasectomy are available.    The patient verbalized understanding and asked appropriate questions that were addressed during this visit. He elects to proceed with a bilateral vasectomy for permanent sterility and family-planning.       PLAN:  Patient will be scheduled for a bilateral vasectomy in the office setting. Electronic prescriptions were provided for 15 mg of oral Diazepam and 2 tablets of Norco 5/325 mg.     Sugey-procedural instructions were provided by myself, re-iterated by the office staff and provided in written form as well.     Gustabo Anderson MD  7/10/2024

## 2024-09-04 ENCOUNTER — LAB ENCOUNTER (OUTPATIENT)
Dept: LAB | Facility: HOSPITAL | Age: 34
End: 2024-09-04
Attending: INTERNAL MEDICINE
Payer: COMMERCIAL

## 2024-09-04 DIAGNOSIS — I51.9 MYXEDEMA HEART DISEASE: Primary | ICD-10-CM

## 2024-09-04 DIAGNOSIS — E03.9 MYXEDEMA HEART DISEASE: Primary | ICD-10-CM

## 2024-09-04 LAB
T4 FREE SERPL-MCNC: 1.3 NG/DL (ref 0.8–1.7)
TSI SER-ACNC: 0.81 MIU/ML (ref 0.55–4.78)

## 2024-09-04 PROCEDURE — 84443 ASSAY THYROID STIM HORMONE: CPT

## 2024-09-04 PROCEDURE — 84439 ASSAY OF FREE THYROXINE: CPT

## 2024-09-04 PROCEDURE — 36415 COLL VENOUS BLD VENIPUNCTURE: CPT

## 2024-09-09 ENCOUNTER — APPOINTMENT (OUTPATIENT)
Dept: URBAN - METROPOLITAN AREA CLINIC 244 | Age: 34
Setting detail: DERMATOLOGY
End: 2024-09-09

## 2024-09-09 DIAGNOSIS — D18.0 HEMANGIOMA: ICD-10-CM

## 2024-09-09 DIAGNOSIS — Z12.83 ENCOUNTER FOR SCREENING FOR MALIGNANT NEOPLASM OF SKIN: ICD-10-CM

## 2024-09-09 DIAGNOSIS — L81.4 OTHER MELANIN HYPERPIGMENTATION: ICD-10-CM

## 2024-09-09 DIAGNOSIS — D22 MELANOCYTIC NEVI: ICD-10-CM

## 2024-09-09 DIAGNOSIS — L82.1 OTHER SEBORRHEIC KERATOSIS: ICD-10-CM

## 2024-09-09 DIAGNOSIS — L21.8 OTHER SEBORRHEIC DERMATITIS: ICD-10-CM

## 2024-09-09 PROBLEM — D23.71 OTHER BENIGN NEOPLASM OF SKIN OF RIGHT LOWER LIMB, INCLUDING HIP: Status: ACTIVE | Noted: 2024-09-09

## 2024-09-09 PROBLEM — D22.9 MELANOCYTIC NEVI, UNSPECIFIED: Status: ACTIVE | Noted: 2024-09-09

## 2024-09-09 PROBLEM — D18.01 HEMANGIOMA OF SKIN AND SUBCUTANEOUS TISSUE: Status: ACTIVE | Noted: 2024-09-09

## 2024-09-09 PROBLEM — D22.61 MELANOCYTIC NEVI OF RIGHT UPPER LIMB, INCLUDING SHOULDER: Status: ACTIVE | Noted: 2024-09-09

## 2024-09-09 PROCEDURE — OTHER SHAVE REMOVAL: OTHER

## 2024-09-09 PROCEDURE — OTHER PRESCRIPTION MEDICATION MANAGEMENT: OTHER

## 2024-09-09 PROCEDURE — 11301 SHAVE SKIN LESION 0.6-1.0 CM: CPT

## 2024-09-09 PROCEDURE — OTHER PRESCRIPTION: OTHER

## 2024-09-09 PROCEDURE — OTHER COUNSELING: OTHER

## 2024-09-09 PROCEDURE — OTHER MIPS QUALITY: OTHER

## 2024-09-09 PROCEDURE — OTHER DIAGNOSIS COMMENT: OTHER

## 2024-09-09 PROCEDURE — 99204 OFFICE O/P NEW MOD 45 MIN: CPT | Mod: 25

## 2024-09-09 RX ORDER — KETOCONAZOLE 20 MG/ML
SHAMPOO, SUSPENSION TOPICAL
Qty: 120 | Refills: 11 | Status: ERX | COMMUNITY
Start: 2024-09-09

## 2024-09-09 ASSESSMENT — LOCATION DETAILED DESCRIPTION DERM
LOCATION DETAILED: MID-FRONTAL SCALP
LOCATION DETAILED: LEFT VENTRAL PROXIMAL FOREARM
LOCATION DETAILED: RIGHT ANTERIOR SHOULDER
LOCATION DETAILED: LEFT SUPERIOR LATERAL BUCCAL CHEEK

## 2024-09-09 ASSESSMENT — LOCATION SIMPLE DESCRIPTION DERM
LOCATION SIMPLE: ANTERIOR SCALP
LOCATION SIMPLE: LEFT FOREARM
LOCATION SIMPLE: RIGHT SHOULDER
LOCATION SIMPLE: LEFT CHEEK

## 2024-09-09 ASSESSMENT — LOCATION ZONE DERM
LOCATION ZONE: SCALP
LOCATION ZONE: ARM
LOCATION ZONE: FACE

## 2024-09-09 NOTE — PROCEDURE: COUNSELING
Detail Level: Generalized
Detail Level: Simple
Detail Level: Zone
Cleanser Recommendations: Vanicream brand

## 2024-09-09 NOTE — PROCEDURE: DIAGNOSIS COMMENT
Comment: blanches on L. forearm. Pt notes NO CHANGE since appearing 5 years ago - he is not concerned. offered bx if changing and/or pt concerned. 
Detail Level: Simple
Render Risk Assessment In Note?: no

## 2024-09-09 NOTE — PROCEDURE: SHAVE REMOVAL
Bill For Surgical Tray: no
Anesthesia Type: 0.5% lidocaine with 1:200,000 epinephrine and a 1:10 solution of 8.4% sodium bicarbonate
Medical Necessity Clause: This procedure was medically necessary because the lesion that was treated was:
Medical Necessity Information: It is in your best interest to select a reason for this procedure from the list below. All of these items fulfill various CMS LCD requirements except the new and changing color options.
Depth Of Shave: dermis
Add Variable For Additional Medical Justification: Yes
Wound Care: Petrolatum
Post-Care Instructions: I reviewed with the patient in detail post-care instructions.\\n\\n Following the removal petrolatum and a bandage were applied. Patient will be notified of biopsy results. However, patient instructed to call the office if not contacted within 2 weeks. Reviewed with the patient in detail post-care instructions. Patient is to keep the area dry for 48 hours, and not to engage in any swimming until the area is healed. If patient does have water exposure, recommend waterproof (hydrocolloid dressing) application. Should the patient develop any fevers, chills, bleeding, severe pain, then the patient will contact the office immediately.\\n\\n** The patient was explicitly instructed to NOT apply any other topicals to the area other than plain vaseline with a clean Q-tip **. No Neosporin or topical Ab unless prescribed/recommended by the Dr is to be used.\\n\\nThe patient is aware to have the area re-evaluated if it is not healed
X Size Of Lesion In Cm (Optional): 0
Billing Type: Third-Party Bill
Detail Level: Detailed
Consent was obtained from the patient. The risks and benefits were discussed in detail. Specifically, the risks of infection, scarring, scabbing, bleeding, prolonged wound healing, incomplete removal, allergy to anesthesia, nerve injury and recurrence were addressed. Prior to the procedure, the treatment site was clearly identified and confirmed by the patient.   \\n\\nPatient aware that I cannot guarantee cosmetic outcome of biopsy and biopsy is done for medical purposes (not cosmetic).\\n\\nPatient aware that sometimes removal/biopsy results can be inconclusive and that another biopsy or procedure may be required. \\nPatient aware that another procedure may be required pending the diagnosis.
Biopsy Method: double edge Personna blade
Notification Instructions: Patient will be notified of pathology results. However, patient instructed to call the office if not contacted within 2 weeks.
Size Of Lesion In Cm (Required): 0.8
Hemostasis: Drysol
Lab: -[id number]

## 2024-10-17 ENCOUNTER — PATIENT MESSAGE (OUTPATIENT)
Dept: SURGERY | Facility: CLINIC | Age: 34
End: 2024-10-17

## 2024-10-24 ENCOUNTER — PROCEDURE (OUTPATIENT)
Dept: SURGERY | Facility: CLINIC | Age: 34
End: 2024-10-24
Payer: COMMERCIAL

## 2024-10-24 VITALS — DIASTOLIC BLOOD PRESSURE: 78 MMHG | SYSTOLIC BLOOD PRESSURE: 111 MMHG | HEART RATE: 90 BPM

## 2024-10-24 DIAGNOSIS — Z30.2 ENCOUNTER FOR STERILIZATION: Primary | ICD-10-CM

## 2024-10-24 PROCEDURE — 55250 REMOVAL OF SPERM DUCT(S): CPT | Performed by: UROLOGY

## 2024-10-24 PROCEDURE — 3074F SYST BP LT 130 MM HG: CPT | Performed by: UROLOGY

## 2024-10-24 PROCEDURE — 3078F DIAST BP <80 MM HG: CPT | Performed by: UROLOGY

## 2024-10-24 RX ORDER — HYDROCODONE BITARTRATE AND ACETAMINOPHEN 5; 325 MG/1; MG/1
TABLET ORAL
COMMUNITY
Start: 2024-07-10

## 2024-10-24 RX ORDER — DIAZEPAM 5 MG/1
TABLET ORAL
COMMUNITY
Start: 2024-07-10

## 2024-10-24 NOTE — PROCEDURES
UROLOGY PROCEDURE NOTE  NO-SCALPEL BILATERAL VASECTOMY      PREOPERATIVE DIAGNOSIS: Desires voluntary sterilization - bilateral vasectomy.    POSTOPERATIVE DIAGNOSIS: Desires voluntary sterilization - bilateral vasectomy.    PROCEDURE PERFORMED: Voluntary sterilization - bilateral no-scalpel vasectomy.     ANESTHESIA: Diazepam 15 mg orally and 2 pills of hydrocodone 5/325 mg orally and 2% Xylocaine injectable.       INDICATIONS:   Before surgery today, I once again discussed  with the patient the following issues, complications, side effects of vasectomy: possible failure of the procedure with possibility that patient could still end up making his wife pregnant, despite undergoing this procedure; bleeding complications; wound infection; pain that might never go away; the fact that he is still fertile immediately after the procedure; that if he changes his mind, a reversal operation may not  be successful; as well as other side effects and complications and the patient understands and still wishes to proceed and feels comfortable with his decision.     DESCRIPTION OF THE PROCEDURE:      The patient was given the anesthesia as indicated above. He was prepped and draped in usual sterile fashion.      The right vas deferens was isolated under the scrotal skin. The skin over it was infiltrated with 2% Plain Xylocaine. A small opening was made in the skin over the vas; the vas was delivered out of the wound; it was skeletonized and exposed for a 3-4 cm segment. Hemostasis was controlled by electrocoagulation. A 1 cm segment was excised. The ends were fulgurated. One medium clip was placed on the abdominal side and another medium clip was placed on the testicular side. The ends were then allowed to fall back into the wound.      The exact same procedure was performed on the contralateral side through the same skin opening. At the end of the procedure, the skin was approximated using Skin Affix topical skin adhesive.  Each  segment of vas deferens was sent separately in formalin to pathology for identification.  The patient tolerated the procedure well.     Post-procedural care and follow-up instructions were provided.     Gustabo Anderson MD  10/24/24

## 2024-12-05 ENCOUNTER — LAB ENCOUNTER (OUTPATIENT)
Dept: LAB | Facility: HOSPITAL | Age: 34
End: 2024-12-05
Attending: UROLOGY
Payer: COMMERCIAL

## 2024-12-05 DIAGNOSIS — Z30.2 ENCOUNTER FOR STERILIZATION: ICD-10-CM

## 2024-12-05 PROCEDURE — 89321 SEMEN ANAL SPERM DETECTION: CPT

## 2025-02-18 ENCOUNTER — TELEPHONE (OUTPATIENT)
Dept: SURGERY | Facility: CLINIC | Age: 35
End: 2025-02-18

## 2025-03-12 ENCOUNTER — LAB ENCOUNTER (OUTPATIENT)
Dept: LAB | Facility: HOSPITAL | Age: 35
End: 2025-03-12
Attending: INTERNAL MEDICINE
Payer: COMMERCIAL

## 2025-03-12 DIAGNOSIS — I51.9 MYXEDEMA HEART DISEASE: Primary | ICD-10-CM

## 2025-03-12 DIAGNOSIS — E03.9 MYXEDEMA HEART DISEASE: Primary | ICD-10-CM

## 2025-03-12 LAB
T4 FREE SERPL-MCNC: 1.6 NG/DL (ref 0.8–1.7)
TSI SER-ACNC: 1.92 UIU/ML (ref 0.55–4.78)

## 2025-03-12 PROCEDURE — 36415 COLL VENOUS BLD VENIPUNCTURE: CPT

## 2025-03-12 PROCEDURE — 84443 ASSAY THYROID STIM HORMONE: CPT

## 2025-03-12 PROCEDURE — 84439 ASSAY OF FREE THYROXINE: CPT

## (undated) DEVICE — PATIENT TRACKER 9734887XOM NON-INVASIVE

## (undated) DEVICE — NASAL ACCESSORY: Brand: MEDLINE INDUSTRIES, INC.

## (undated) DEVICE — SOLUTION IRRIG 1000ML 0.9% NACL USP BTL

## (undated) DEVICE — WIPE INSTR 3.625X3IN PVA HI ABSRB FAST

## (undated) DEVICE — BLADE 1882040HR 5PK M4 INF TURB 2MM ROT: Brand: STRAIGHTSHOT

## (undated) DEVICE — PACK CDS HEAD

## (undated) DEVICE — SUCTION CANISTER, 3000CC,SAFELINER: Brand: DEROYAL

## (undated) DEVICE — SHEATH 1912040 5PK ES2 STRYKER 4MM/0DEG: Brand: ENDO-SCRUB®

## (undated) DEVICE — MEDI-VAC NON-CONDUCTIVE SUCTION TUBING: Brand: CARDINAL HEALTH

## (undated) DEVICE — PAD,EYE,LARGE,2 1/8"X2 5/8",STERILE,LF: Brand: MEDLINE

## (undated) DEVICE — NEEDLE ANES 22GA L3.5IN SPNL SS QNCKE STYL

## (undated) DEVICE — PACKING 470404 MEROCEL 2000 10PK 8CM: Brand: MEROCEL®

## (undated) DEVICE — GAMMEX® PI HYBRID SIZE 7.5, STERILE POWDER-FREE SURGICAL GLOVE, POLYISOPRENE AND NEOPRENE BLEND: Brand: GAMMEX

## (undated) DEVICE — 20 ML SYRINGE LUER-LOCK TIP: Brand: MONOJECT

## (undated) DEVICE — INSTRUMENT TRACKER 9733533XOM ENT 1PK

## (undated) DEVICE — CODMAN® SURGICAL PATTIES 1/2" X 3" (1.27CM X 7.62CM): Brand: CODMAN®

## (undated) NOTE — LETTER
02/18/25        Luis Casper  [address]  [facility] [address]      Dear Luis,    Our records indicate that you have outstanding lab work and or testing that was ordered for you and has not yet been completed:    Semen Analysis, Postvas      To provide you with the best possible care, please complete these orders at your earliest convenience. If you have recently completed these orders please disregard this letter.     If you have any questions please call the office at [phone].     Thank you,       Urology Staff

## (undated) NOTE — LETTER
[facility]  [address], [place], [place]  Authorization for Surgical Operation and Procedure                                                                                           I hereby authorize Joon Rosenberg MD, my physician and his/her assistants (if applicable), which may include medical students, residents, and/or fellows, to perform the following surgical operation/ procedure and administer such anesthesia as may be determined necessary by my physician: Operation/Procedure name (s) MEDTRONIC ENDOSCOPIC BILATERAL MAXILLARY ANTROSTOMY W/ TISSUE REMOVAL , ENDOSCOPIC BILATERAL TOTAL ETHMOIDECTOMY , ENDOSCOPIC BILATERAL SPHENOID, ENDOSCOPIC BILATERAL FRONTAL, ENDOSCOPIC NASAL POLYPECTOMY, BILATERAL SEPTOPLASTY SUBMUCOSAL TURBINATE REDUCTION BILATERAL FRONTAL SPHENOID BALLOON, BILATERAL SINUVA STENTS on Luis Casper   2.   I recognize that during the surgical operation/procedure, unforeseen conditions may necessitate additional or different procedures than those listed above.  I, therefore, further authorize and request that the above-named surgeon, assistants, or designees perform such procedures as are, in their judgment, necessary and desirable.    3.   My surgeon/physician has discussed prior to my surgery the potential benefits, risks and side effects of this procedure; the likelihood of achieving goals; and potential problems that might occur during recuperation.  They also discussed reasonable alternatives to the procedure, including risks, benefits, and side effects related to the alternatives and risks related to not receiving this procedure.  I have had all my questions answered and I acknowledge that no guarantee has been made as to the result that may be obtained.    4.   Should the need arise during my operation/procedure, which includes change of level of care prior to discharge, I also consent to the administration of blood and/or blood products.  Further, I  understand that despite careful testing and screening of blood or blood products by collecting agencies, I may still be subject to ill effects as a result of receiving a blood transfusion and/or blood products.  The following are some, but not all, of the potential risks that can occur: fever and allergic reactions, hemolytic reactions, transmission of diseases such as Hepatitis, AIDS and Cytomegalovirus (CMV) and fluid overload.  In the event that I wish to have an autologous transfusion of my own blood, or a directed donor transfusion, I will discuss this with my physician.  Check only if Refusing Blood or Blood Products  I understand refusal of blood or blood products as deemed necessary by my physician may have serious consequences to my condition to include possible death. I hereby assume responsibility for my refusal and release the hospital, its personnel, and my physicians from any responsibility for the consequences of my refusal.    o  Refuse   5.   I authorize the use of any specimen, organs, tissues, body parts or foreign objects that may be removed from my body during the operation/procedure for diagnosis, research or teaching purposes and their subsequent disposal by hospital authorities.  I also authorize the release of specimen test results and/or written reports to my treating physician on the hospital medical staff or other referring or consulting physicians involved in my care, at the discretion of the Pathologist or my treating physician.    6.   I consent to the photographing or videotaping of the operations or procedures to be performed, including appropriate portions of my body for medical, scientific, or educational purposes, provided my identity is not revealed by the pictures or by descriptive texts accompanying them.  If the procedure has been photographed/videotaped, the surgeon will obtain the original picture, image, videotape or CD.  The hospital will not be responsible for storage,  release or maintenance of the picture, image, tape or CD.    7.   I consent to the presence of a  or observers in the operating room as deemed necessary by my physician or their designees.    8.   I recognize that in the event my procedure results in extended X-Ray/fluoroscopy time, I may develop a skin reaction.    9. If I have a Do Not Attempt Resuscitation (DNAR) order in place, that status will be suspended while in the operating room, procedural suite, and during the recovery period unless otherwise explicitly stated by me (or a person authorized to consent on my behalf). The surgeon or my attending physician will determine when the applicable recovery period ends for purposes of reinstating the DNAR order.  10. Patients having a sterilization procedure: I understand that if the procedure is successful the results will be permanent and it will therefore be impossible for me to inseminate, conceive, or bear children.  I also understand that the procedure is intended to result in sterility, although the result has not been guaranteed.   11. I acknowledge that my physician has explained sedation/analgesia administration to me including the risk and benefits I consent to the administration of sedation/analgesia as may be necessary or desirable in the judgment of my physician.    I CERTIFY THAT I HAVE READ AND FULLY UNDERSTAND THE ABOVE CONSENT TO OPERATION and/or OTHER PROCEDURE.     _________________________________________ _________________________________     ___________________________________  Signature of Patient     Signature of Responsible Person                   Printed Name of Responsible Person                              _________________________________________ ______________________________        ___________________________________  Signature of Witness         Date  Time         Relationship to Patient    STATEMENT OF PHYSICIAN My signature below affirms that prior to the time of  the procedure; I have explained to the patient and/or his/her legal representative, the risks and benefits involved in the proposed treatment and any reasonable alternative to the proposed treatment. I have also explained the risks and benefits involved in refusal of the proposed treatment and alternatives to the proposed treatment and have answered the patient's questions. If I have a significant financial interest in a co-management agreement or a significant financial interest in any product or implant, or other significant relationship used in this procedure/surgery, I have disclosed this and had a discussion with my patient.     _______________________________________________________________ _____________________________  (Signature of Physician)                                                                                         (Date)                                   (Time)  Patient Name: Luis Akhtar Tremayne    : 1990   Printed: 3/25/2024      Medical Record #: [id number]                                              Page 2 of 2